# Patient Record
Sex: MALE | Race: WHITE | Employment: FULL TIME | URBAN - METROPOLITAN AREA
[De-identification: names, ages, dates, MRNs, and addresses within clinical notes are randomized per-mention and may not be internally consistent; named-entity substitution may affect disease eponyms.]

---

## 2018-10-31 ENCOUNTER — HOSPITAL ENCOUNTER (OUTPATIENT)
Dept: SURGERY | Age: 63
Discharge: HOME | End: 2018-10-31

## 2018-11-06 ENCOUNTER — ANESTHESIA EVENT (OUTPATIENT)
Dept: ENDOSCOPY | Age: 63
End: 2018-11-06
Payer: COMMERCIAL

## 2018-11-06 RX ORDER — SODIUM CHLORIDE, SODIUM LACTATE, POTASSIUM CHLORIDE, CALCIUM CHLORIDE 600; 310; 30; 20 MG/100ML; MG/100ML; MG/100ML; MG/100ML
100 INJECTION, SOLUTION INTRAVENOUS CONTINUOUS
Status: CANCELLED | OUTPATIENT
Start: 2018-11-06

## 2018-11-06 RX ORDER — SODIUM CHLORIDE 0.9 % (FLUSH) 0.9 %
5-10 SYRINGE (ML) INJECTION AS NEEDED
Status: CANCELLED | OUTPATIENT
Start: 2018-11-06

## 2018-11-07 ENCOUNTER — ANESTHESIA (OUTPATIENT)
Dept: ENDOSCOPY | Age: 63
End: 2018-11-07
Payer: COMMERCIAL

## 2018-11-07 ENCOUNTER — HOSPITAL ENCOUNTER (OUTPATIENT)
Age: 63
Setting detail: OUTPATIENT SURGERY
Discharge: HOME | End: 2018-11-07
Attending: SURGERY | Admitting: SURGERY
Payer: COMMERCIAL

## 2018-11-07 VITALS
TEMPERATURE: 98.1 F | OXYGEN SATURATION: 95 % | SYSTOLIC BLOOD PRESSURE: 131 MMHG | WEIGHT: 188.56 LBS | DIASTOLIC BLOOD PRESSURE: 84 MMHG | HEART RATE: 76 BPM | RESPIRATION RATE: 20 BRPM | BODY MASS INDEX: 25.4 KG/M2

## 2018-11-07 PROCEDURE — 76060000032 HC ANESTHESIA 0.5 TO 1 HR: Performed by: SURGERY

## 2018-11-07 PROCEDURE — 77030009426 HC FCPS BIOP ENDOSC BSC -B: Performed by: SURGERY

## 2018-11-07 PROCEDURE — 74011250636 HC RX REV CODE- 250/636

## 2018-11-07 PROCEDURE — 88305 TISSUE EXAM BY PATHOLOGIST: CPT

## 2018-11-07 PROCEDURE — 74011250636 HC RX REV CODE- 250/636: Performed by: ANESTHESIOLOGY

## 2018-11-07 PROCEDURE — 88312 SPECIAL STAINS GROUP 1: CPT

## 2018-11-07 PROCEDURE — 76040000026: Performed by: SURGERY

## 2018-11-07 RX ORDER — SODIUM CHLORIDE, SODIUM LACTATE, POTASSIUM CHLORIDE, CALCIUM CHLORIDE 600; 310; 30; 20 MG/100ML; MG/100ML; MG/100ML; MG/100ML
100 INJECTION, SOLUTION INTRAVENOUS CONTINUOUS
Status: DISCONTINUED | OUTPATIENT
Start: 2018-11-07 | End: 2018-11-07 | Stop reason: HOSPADM

## 2018-11-07 RX ORDER — LIDOCAINE HYDROCHLORIDE 20 MG/ML
INJECTION, SOLUTION EPIDURAL; INFILTRATION; INTRACAUDAL; PERINEURAL AS NEEDED
Status: DISCONTINUED | OUTPATIENT
Start: 2018-11-07 | End: 2018-11-07 | Stop reason: HOSPADM

## 2018-11-07 RX ORDER — PROPOFOL 10 MG/ML
INJECTION, EMULSION INTRAVENOUS AS NEEDED
Status: DISCONTINUED | OUTPATIENT
Start: 2018-11-07 | End: 2018-11-07 | Stop reason: HOSPADM

## 2018-11-07 RX ADMIN — PROPOFOL 25 MG: 10 INJECTION, EMULSION INTRAVENOUS at 11:09

## 2018-11-07 RX ADMIN — PROPOFOL 50 MG: 10 INJECTION, EMULSION INTRAVENOUS at 11:02

## 2018-11-07 RX ADMIN — PROPOFOL 50 MG: 10 INJECTION, EMULSION INTRAVENOUS at 11:16

## 2018-11-07 RX ADMIN — PROPOFOL 25 MG: 10 INJECTION, EMULSION INTRAVENOUS at 11:07

## 2018-11-07 RX ADMIN — PROPOFOL 50 MG: 10 INJECTION, EMULSION INTRAVENOUS at 11:23

## 2018-11-07 RX ADMIN — PROPOFOL 50 MG: 10 INJECTION, EMULSION INTRAVENOUS at 11:10

## 2018-11-07 RX ADMIN — PROPOFOL 25 MG: 10 INJECTION, EMULSION INTRAVENOUS at 11:08

## 2018-11-07 RX ADMIN — PROPOFOL 50 MG: 10 INJECTION, EMULSION INTRAVENOUS at 11:13

## 2018-11-07 RX ADMIN — PROPOFOL 50 MG: 10 INJECTION, EMULSION INTRAVENOUS at 11:30

## 2018-11-07 RX ADMIN — PROPOFOL 25 MG: 10 INJECTION, EMULSION INTRAVENOUS at 11:05

## 2018-11-07 RX ADMIN — PROPOFOL 25 MG: 10 INJECTION, EMULSION INTRAVENOUS at 11:36

## 2018-11-07 RX ADMIN — PROPOFOL 50 MG: 10 INJECTION, EMULSION INTRAVENOUS at 11:25

## 2018-11-07 RX ADMIN — PROPOFOL 25 MG: 10 INJECTION, EMULSION INTRAVENOUS at 11:03

## 2018-11-07 RX ADMIN — SODIUM CHLORIDE, SODIUM LACTATE, POTASSIUM CHLORIDE, AND CALCIUM CHLORIDE: 600; 310; 30; 20 INJECTION, SOLUTION INTRAVENOUS at 10:08

## 2018-11-07 RX ADMIN — PROPOFOL 50 MG: 10 INJECTION, EMULSION INTRAVENOUS at 11:18

## 2018-11-07 RX ADMIN — PROPOFOL 50 MG: 10 INJECTION, EMULSION INTRAVENOUS at 11:21

## 2018-11-07 RX ADMIN — LIDOCAINE HYDROCHLORIDE 60 MG: 20 INJECTION, SOLUTION EPIDURAL; INFILTRATION; INTRACAUDAL; PERINEURAL at 11:02

## 2018-11-07 RX ADMIN — PROPOFOL 50 MG: 10 INJECTION, EMULSION INTRAVENOUS at 11:28

## 2018-11-07 RX ADMIN — PROPOFOL 50 MG: 10 INJECTION, EMULSION INTRAVENOUS at 11:12

## 2018-11-07 RX ADMIN — PROPOFOL 25 MG: 10 INJECTION, EMULSION INTRAVENOUS at 11:04

## 2018-11-07 NOTE — ANESTHESIA PREPROCEDURE EVALUATION
Anesthetic History No history of anesthetic complications Review of Systems / Medical History Patient summary reviewed, nursing notes reviewed and pertinent labs reviewed Pulmonary Smoker Neuro/Psych Within defined limits Cardiovascular Pertinent negatives: No CAD Exercise tolerance: >4 METS 
  
GI/Hepatic/Renal 
  
GERD: well controlled Endo/Other Within defined limits Other Findings Physical Exam 
 
Airway Mallampati: II 
 
 
Mouth opening: Normal 
 
 Cardiovascular Regular rate and rhythm,  S1 and S2 normal,  no murmur, click, rub, or gallop Dental 
 
Dentition: Poor dentition and Upper partial plate Pulmonary Breath sounds clear to auscultation Abdominal 
 
 
 
 Other Findings Anesthetic Plan ASA: 2 Anesthesia type: total IV anesthesia Induction: Intravenous Anesthetic plan and risks discussed with: Patient and Spouse Discussed TIVA with its benefits (lower risk of nausea and sore throat, etc.) and risks including possible awareness, patient understands and elects to proceed

## 2018-11-07 NOTE — ANESTHESIA POSTPROCEDURE EVALUATION
Procedure(s): ESOPHAGOGASTRODUODENOSCOPY (EGD) POSS DILATION 
COLONOSCOPY/ 25 
ESOPHAGOGASTRODUODENAL (EGD) BIOPSY 
ESOPHAGEAL DILATION 
ENDOSCOPIC POLYPECTOMY. Anesthesia Post Evaluation Multimodal analgesia: multimodal analgesia used between 6 hours prior to anesthesia start to PACU discharge Patient location during evaluation: PACU Patient participation: complete - patient participated Level of consciousness: responsive to verbal stimuli Pain management: adequate Airway patency: patent Anesthetic complications: no 
Cardiovascular status: acceptable Respiratory status: spontaneous ventilation and nonlabored ventilation Hydration status: acceptable Comments: No Nausea Visit Vitals /84 (BP 1 Location: Left arm, BP Patient Position: At rest) Pulse 76 Temp 36.7 °C (98.1 °F) Resp 20 Wt 85.5 kg (188 lb 9 oz) SpO2 95% BMI 25.40 kg/m²

## 2018-11-07 NOTE — DISCHARGE INSTRUCTIONS
Clare Wade M.D.  [phone]    Instructions following colonoscopy:    ACTIVITY:   Resume usual, basic activities around the house today.  You may be light-headed or sleepy from anesthesia, so be careful going up and down stairs.  Avoid driving, operating machinery, or signing documents for 24 hours. DIET:   No restriction. Please note, some people may have nausea or cramps after this procedure which can result in an upset stomach after eating.  Many people have loose stools or diarrhea immediately after colonoscopy. It is also not uncommon to not have a bowel movement for 2-3 days. PAIN:   Some cramping or gas pain is normal after colonoscopy. However, if you experience worsening pain over the course of the day, or pain with associated fever please call the office immediately      [id number] IF:   You have a temperature higher than 101.5° Fahrenheit for more than 6 hours.  You have severe nausea or vomiting not relieved by medication; or diarrhea. If you take a blood thinning medicine resume it:    Otherwise, continue home medications as previously prescribed.

## 2018-11-07 NOTE — PERIOP NOTES
Patients numbers incorrect in system [employer] - [phone] and wife, Robinson Wilson - [phone]. Called business office to change in the system.

## 2018-11-07 NOTE — PROCEDURES
Esophagogastroduodenoscopy Procedure Note      Patient: Lázaro Hutson MRN: [id number]     YOB: 1955  Age: 60 y.o. Sex: male           Procedure in Detail:  Informed consent was obtained for the procedure, the patient was brought to the endoscopy suite and sedation was induced by anesthesia. The [id number] gastroscope was inserted into the mouth and advanced under direct vision to second portion of the duodenum. A careful inspection was made as the gastroscope was withdrawn, including a retroflexed view of the proximal stomach; findings and interventions are described below, with appropriate photodocumentation obtained. Findings:   Oropharynx:  Normal     - no significant visual abnormality of (closed)cords  Esophagus:  Normal     - to EGJ. focal stricture/ring at EGJ.  no inflammatory changes. dilated  Cardia of the stomach:  Normal  Body of the stomach:    - Flat lesions:     - mild gastritis  Fundus of the stomach:    - Flat lesions:     - mild gastritis  Antrum of the stomach:    - Flat lesions:     - mild gastritis  Duodenum:  Normal    Therapies:    esophageal dilation with savary sizes Horton 56/60F. Small focal tear of stricture noted on re-examination  biopsy of stomach pre pyloric antrum    EBL-  minimal    Specimens: Specimens were collected and sent to pathology. Complications:   None; patient tolerated the procedure well. Recommendations:  - Continue acid suppression.  - Await pathology.   - if indigestion persists, consider carafate- scant bilious fluid noted on initial gastric visualization    Signed by: Matteo Golden MD                         November 7, 2018

## 2018-11-07 NOTE — H&P
History and Physical 
 
Patient: Ebb Papa Physician: Yelena Santos MD 
 
Referring Physician: Maxwell Lambert MD 
 
Chief Complaint: For EGD/colonoscopy History of Present Illness: Personal history of colon polyps (screening only). His history is notable for no abdominal pain, no change in bowel habits, no blood in stools or black tarry stools. Last study:   2012 with single ascending tubular adenoma. There is not known colon cancer/adenomatous polyps in the family (see family history). 
  
Pt also underwent EGD/dilation at that time due to dysphagia. He was found to have mild esophageal stricture, dilated, and H pylori gastritis. He continues to have \"indigestion\" reported as bloating and feeling of need to burp. He takes Tums 1-2x/week with some benefit. Does not have difficulty swallowing or epigastric pain. A trial of OTC omperazole was suggested at recent office visit and he reports it is helping significantly. History: 
Past Medical History:  
Diagnosis Date  Abnormal EKG  Coronary atherosclerosis of native coronary vessel 10/14/2015  Difficulty swallowing x 1 yr--   
 not every day---but getting worse  GERD (gastroesophageal reflux disease)   
 has had an esophageal stricture dilated twice in past.  Last about 2 years ago  Helicobacter pylori gastritis 2012  Hyperlipidemia other unsp dyslipidemia 10/14/2015  Obese   
 bmi=31  
 Stricture of esophagus 2012  Tobacco use disorder 10/14/2015 Past Surgical History:  
Procedure Laterality Date  DILATE ESOPHAGUS  5/18/2012  HX LAP CHOLECYSTECTOMY  2004  [place] FL[place] W/RMVL OF TUMOR POLYP LESION SNARE TQ  5/18/2012  [place] TRANSORAL BIOPSY SINGLE/MULTIPLE  5/18/2012 Social History Socioeconomic History  Marital status:  Spouse name: Not on file  Number of children: Not on file  Years of education: Not on file  Highest education level: Not on file Social Needs  Financial resource strain: Not on file  Food insecurity - worry: Not on file  Food insecurity - inability: Not on file  Transportation needs - medical: Not on file  Transportation needs - non-medical: Not on file Occupational History  Not on file Tobacco Use  Smoking status: Current Every Day Smoker Years: 6.00  Smokeless tobacco: Never Used  Tobacco comment: smokes 5 cigars Substance and Sexual Activity  Alcohol use: No  
 Drug use: No  
 Sexual activity: Yes  
  Partners: Female Other Topics Concern  Not on file Social History Narrative  Not on file Family History Problem Relation Age of Onset  Cancer Brother Medications:  
Prior to Admission medications Medication Sig Start Date End Date Taking? Authorizing Provider  
topiramate (TOPAMAX) 100 mg tablet Take 1 Tab by mouth two (2) times a day. 8/4/17  Yes Katelin Woods MD  
zaleplon (SONATA) 10 mg capsule Take 1 Cap by mouth nightly. Max Daily Amount: 10 mg. 8/4/17  Yes Katelin Woods MD  
SUMAtriptan (IMITREX) 100 mg tablet Take 1 Tab by mouth once as needed for Migraine. 8/4/17  Yes Katelin Woods MD  
ibuprofen (ADVIL) 200 mg tablet Take 400 mg by mouth every six (6) hours as needed. Stopped 5/16/12    Yes Provider, Historical  
 
 
Allergies: No Known Allergies Physical Exam:  
 
Vital Signs:  
Visit Vitals BP (!) 150/93 Pulse 83 Temp 98.1 °F (36.7 °C) Resp 16 Wt 188 lb 9 oz (85.5 kg) SpO2 97% BMI 25.40 kg/m² Junius El General: no distress Heart: regular Lungs: unlabored Abdominal: soft Neurological: Grossly normal  
  
 
Findings/Diagnosis: Encounter for colorectal cancer screening due to personal history of adenomatous polyp(s) . Indigestion with h/o H pylori gastritis,  GERD with h/o esophageal stricture Plan of Care/Planned Procedure: egd possible dilation,  Colonoscopy, possible polypectomy. Pt/designee agree to proceed. Signed: 
Stalin Parsons MD 
 11/7/2018

## 2018-11-07 NOTE — PROCEDURES
Procedure in Detail:  Informed consent was obtained for the procedure. The patient was placed in the left lateral decubitus position and sedation was induced by anesthesia. The [id number] was inserted into the rectum and advanced under direct vision to the cecum, which was identified by the appendiceal orifice. The quality of the colonic preparation was fair, consistent with pt reported solid breakfast yesterday. In addition, visualization limited by unrelenting coughing. A careful inspection was made as the colonoscope was withdrawn, including a retroflexed view of the rectum; findings and interventions are described below. Appropriate photodocumentation was obtained. Findings:   Rectum:   Normal  Sigmoid:     - Excavated lesions:     - Diverticulosis  Descending Colon:     - Excavated lesions:     - Diverticulum  Transverse Colon:     - Excavated lesions:     - Diverticulum  Ascending Colon:   Normal  Cecum:     - Protruding lesions:     -Sessile Polyp(s) size 3 mm removed by polypectomy (hot biopsy)          Specimens: Specimens were collected and sent to pathology. Complications: None; patient tolerated the procedure well. \    EBL - none    Recommendations:   - Await pathology.      Signed By: Mellisa Leary MD                        November 7, 2018

## 2018-12-05 ENCOUNTER — APPOINTMENT (OUTPATIENT)
Dept: GENERAL RADIOLOGY | Age: 63
DRG: 247 | End: 2018-12-05
Attending: EMERGENCY MEDICINE
Payer: COMMERCIAL

## 2018-12-05 ENCOUNTER — HOSPITAL ENCOUNTER (INPATIENT)
Age: 63
LOS: 1 days | Discharge: HOME | DRG: 247 | End: 2018-12-06
Attending: EMERGENCY MEDICINE | Admitting: INTERNAL MEDICINE
Payer: COMMERCIAL

## 2018-12-05 DIAGNOSIS — R77.8 ELEVATED TROPONIN: ICD-10-CM

## 2018-12-05 DIAGNOSIS — I24.9 ACUTE CORONARY SYNDROME (HCC): Primary | ICD-10-CM

## 2018-12-05 PROBLEM — I20.0 UNSTABLE ANGINA (HCC): Status: ACTIVE | Noted: 2018-12-05

## 2018-12-05 PROBLEM — I21.4 NSTEMI (NON-ST ELEVATED MYOCARDIAL INFARCTION) (HCC): Status: ACTIVE | Noted: 2018-12-05

## 2018-12-05 LAB
ALBUMIN SERPL-MCNC: 4 G/DL (ref 3.2–4.6)
ALBUMIN/GLOB SERPL: 1.1 {RATIO}
ALP SERPL-CCNC: 79 U/L (ref 50–136)
ALT SERPL-CCNC: 27 U/L (ref 12–65)
ANION GAP SERPL CALC-SCNC: 8 MMOL/L
APTT PPP: 30.8 SEC (ref 24.7–39.8)
AST SERPL-CCNC: 19 U/L (ref 15–37)
BASOPHILS # BLD: 0.1 K/UL (ref 0–0.2)
BASOPHILS NFR BLD: 1 % (ref 0–2)
BILIRUB SERPL-MCNC: 0.2 MG/DL (ref 0.2–1.1)
BUN SERPL-MCNC: 19 MG/DL (ref 8–23)
CALCIUM SERPL-MCNC: 8.7 MG/DL (ref 8.3–10.4)
CHLORIDE SERPL-SCNC: 108 MMOL/L (ref 98–107)
CHOLEST SERPL-MCNC: 158 MG/DL
CO2 SERPL-SCNC: 26 MMOL/L (ref 21–32)
CREAT SERPL-MCNC: 0.99 MG/DL (ref 0.8–1.5)
DIFFERENTIAL METHOD BLD: NORMAL
EOSINOPHIL # BLD: 0.1 K/UL (ref 0–0.8)
EOSINOPHIL NFR BLD: 1 % (ref 0.5–7.8)
ERYTHROCYTE [DISTWIDTH] IN BLOOD BY AUTOMATED COUNT: 13.1 % (ref 11.9–14.6)
GLOBULIN SER CALC-MCNC: 3.6 G/DL (ref 2.3–3.5)
GLUCOSE SERPL-MCNC: 108 MG/DL (ref 65–100)
HCT VFR BLD AUTO: 48.3 % (ref 41.1–50.3)
HDLC SERPL-MCNC: 38 MG/DL (ref 40–60)
HDLC SERPL: 4.2 {RATIO}
HGB BLD-MCNC: 16.2 G/DL (ref 13.6–17.2)
IMM GRANULOCYTES # BLD: 0 K/UL (ref 0–0.5)
IMM GRANULOCYTES NFR BLD AUTO: 0 % (ref 0–5)
INR PPP: 0.9
LDLC SERPL CALC-MCNC: 79.2 MG/DL
LIPID PROFILE,FLP: ABNORMAL
LYMPHOCYTES # BLD: 2.2 K/UL (ref 0.5–4.6)
LYMPHOCYTES NFR BLD: 26 % (ref 13–44)
MCH RBC QN AUTO: 32.1 PG (ref 26.1–32.9)
MCHC RBC AUTO-ENTMCNC: 33.5 G/DL (ref 31.4–35)
MCV RBC AUTO: 95.6 FL (ref 79.6–97.8)
MONOCYTES # BLD: 0.9 K/UL (ref 0.1–1.3)
MONOCYTES NFR BLD: 11 % (ref 4–12)
NEUTS SEG # BLD: 5.1 K/UL (ref 1.7–8.2)
NEUTS SEG NFR BLD: 61 % (ref 43–78)
NRBC # BLD: 0 K/UL (ref 0–0.2)
PLATELET # BLD AUTO: 248 K/UL (ref 150–450)
PMV BLD AUTO: 10.4 FL (ref 9.4–12.3)
POTASSIUM SERPL-SCNC: 3.8 MMOL/L (ref 3.5–5.1)
PROT SERPL-MCNC: 7.6 G/DL
PROTHROMBIN TIME: 12.2 SEC (ref 11.7–14.5)
RBC # BLD AUTO: 5.05 M/UL (ref 4.23–5.6)
SODIUM SERPL-SCNC: 142 MMOL/L (ref 136–145)
TRIGL SERPL-MCNC: 204 MG/DL (ref 35–150)
TROPONIN I BLD-MCNC: 0.46 NG/ML (ref 0.02–0.05)
VLDLC SERPL CALC-MCNC: 40.8 MG/DL (ref 6–23)
WBC # BLD AUTO: 8.3 K/UL (ref 4.3–11.1)

## 2018-12-05 PROCEDURE — 96375 TX/PRO/DX INJ NEW DRUG ADDON: CPT | Performed by: EMERGENCY MEDICINE

## 2018-12-05 PROCEDURE — 65660000000 HC RM CCU STEPDOWN

## 2018-12-05 PROCEDURE — 84484 ASSAY OF TROPONIN QUANT: CPT

## 2018-12-05 PROCEDURE — 96365 THER/PROPH/DIAG IV INF INIT: CPT | Performed by: EMERGENCY MEDICINE

## 2018-12-05 PROCEDURE — 85730 THROMBOPLASTIN TIME PARTIAL: CPT

## 2018-12-05 PROCEDURE — 96374 THER/PROPH/DIAG INJ IV PUSH: CPT | Performed by: EMERGENCY MEDICINE

## 2018-12-05 PROCEDURE — 80061 LIPID PANEL: CPT

## 2018-12-05 PROCEDURE — 85610 PROTHROMBIN TIME: CPT

## 2018-12-05 PROCEDURE — 74011250636 HC RX REV CODE- 250/636: Performed by: EMERGENCY MEDICINE

## 2018-12-05 PROCEDURE — 74011250637 HC RX REV CODE- 250/637: Performed by: INTERNAL MEDICINE

## 2018-12-05 PROCEDURE — 74011250637 HC RX REV CODE- 250/637: Performed by: EMERGENCY MEDICINE

## 2018-12-05 PROCEDURE — 85025 COMPLETE CBC W/AUTO DIFF WBC: CPT

## 2018-12-05 PROCEDURE — 96366 THER/PROPH/DIAG IV INF ADDON: CPT | Performed by: EMERGENCY MEDICINE

## 2018-12-05 PROCEDURE — 99285 EMERGENCY DEPT VISIT HI MDM: CPT | Performed by: EMERGENCY MEDICINE

## 2018-12-05 PROCEDURE — 71046 X-RAY EXAM CHEST 2 VIEWS: CPT

## 2018-12-05 PROCEDURE — 74011000250 HC RX REV CODE- 250: Performed by: EMERGENCY MEDICINE

## 2018-12-05 PROCEDURE — 80053 COMPREHEN METABOLIC PANEL: CPT

## 2018-12-05 PROCEDURE — 94762 N-INVAS EAR/PLS OXIMTRY CONT: CPT | Performed by: EMERGENCY MEDICINE

## 2018-12-05 PROCEDURE — 93005 ELECTROCARDIOGRAM TRACING: CPT | Performed by: EMERGENCY MEDICINE

## 2018-12-05 RX ORDER — GUAIFENESIN 100 MG/5ML
81 LIQUID (ML) ORAL DAILY
Status: DISCONTINUED | OUTPATIENT
Start: 2018-12-06 | End: 2018-12-06 | Stop reason: HOSPADM

## 2018-12-05 RX ORDER — GUAIFENESIN 100 MG/5ML
324 LIQUID (ML) ORAL
Status: COMPLETED | OUTPATIENT
Start: 2018-12-05 | End: 2018-12-05

## 2018-12-05 RX ORDER — SODIUM CHLORIDE 0.9 % (FLUSH) 0.9 %
5-10 SYRINGE (ML) INJECTION EVERY 8 HOURS
Status: DISCONTINUED | OUTPATIENT
Start: 2018-12-05 | End: 2018-12-06 | Stop reason: HOSPADM

## 2018-12-05 RX ORDER — METOPROLOL TARTRATE 5 MG/5ML
5 INJECTION INTRAVENOUS ONCE
Status: COMPLETED | OUTPATIENT
Start: 2018-12-05 | End: 2018-12-05

## 2018-12-05 RX ORDER — HEPARIN SODIUM 5000 [USP'U]/ML
4000 INJECTION, SOLUTION INTRAVENOUS; SUBCUTANEOUS
Status: COMPLETED | OUTPATIENT
Start: 2018-12-05 | End: 2018-12-05

## 2018-12-05 RX ORDER — HEPARIN SODIUM 5000 [USP'U]/100ML
12-25 INJECTION, SOLUTION INTRAVENOUS
Status: DISCONTINUED | OUTPATIENT
Start: 2018-12-05 | End: 2018-12-06 | Stop reason: HOSPADM

## 2018-12-05 RX ORDER — SODIUM CHLORIDE 0.9 % (FLUSH) 0.9 %
5-10 SYRINGE (ML) INJECTION AS NEEDED
Status: DISCONTINUED | OUTPATIENT
Start: 2018-12-05 | End: 2018-12-06 | Stop reason: HOSPADM

## 2018-12-05 RX ORDER — ATORVASTATIN CALCIUM 40 MG/1
80 TABLET, FILM COATED ORAL
Status: DISCONTINUED | OUTPATIENT
Start: 2018-12-05 | End: 2018-12-06 | Stop reason: HOSPADM

## 2018-12-05 RX ADMIN — METOPROLOL TARTRATE 5 MG: 1 INJECTION, SOLUTION INTRAVENOUS at 17:41

## 2018-12-05 RX ADMIN — ASPIRIN 81 MG 324 MG: 81 TABLET ORAL at 17:39

## 2018-12-05 RX ADMIN — ATORVASTATIN CALCIUM 80 MG: 40 TABLET, FILM COATED ORAL at 23:24

## 2018-12-05 RX ADMIN — HEPARIN SODIUM 4000 UNITS: 5000 INJECTION INTRAVENOUS; SUBCUTANEOUS at 18:43

## 2018-12-05 RX ADMIN — NITROGLYCERIN 1 INCH: 20 OINTMENT TOPICAL at 17:38

## 2018-12-05 RX ADMIN — HEPARIN SODIUM AND DEXTROSE 12 UNITS/KG/HR: 5000; 5 INJECTION INTRAVENOUS at 19:26

## 2018-12-05 RX ADMIN — Medication 10 ML: at 23:24

## 2018-12-05 NOTE — ED TRIAGE NOTES
Pt states intermittent chest pain for about two days. States nothing makes the pain better or worse. States today he got a little SOB and left arm went numb.  Pt states was evaluated by GI last month and BP was normal.

## 2018-12-05 NOTE — ED PROVIDER NOTES
Patient complains of multiple episodes of chest discomfort today while at work. Chest discomfort described as a pressure or heaviness to the anterior aspect of his upper chest with associated shortness of breath, nausea, diaphoresis with the last episode. Chest pain only lasted for approximate 10-15 minutes, resolved with rest. 
 
 
The history is provided by the patient. Chest Pain (Angina) This is a new problem. The current episode started 3 to 5 hours ago. The problem has been resolved. Duration of episode(s) is 15 minutes. The problem occurs daily. The quality of the pain is described as pressure-like and heavy. The pain radiates to the left arm. The symptoms are aggravated by exertion. Associated symptoms include diaphoresis (With last episode), exertional chest pressure, malaise/fatigue, nausea (with chest pain), PND (possibly one episode 2 nights ago  with associated chest discomfort, awakening him from sleep) and shortness of breath (when he gets the discomfort). Pertinent negatives include no abdominal pain, no back pain, no claudication, no cough, no dizziness, no fever, no headaches, no hemoptysis, no irregular heartbeat, no leg pain, no lower extremity edema, no near-syncope, no numbness, no orthopnea, no palpitations, no sputum production, no vomiting and no weakness. He has tried rest for the symptoms. The treatment provided significant relief. Risk factors include smoking/tobacco exposure and male gender. His past medical history does not include aneurysm, cancer, DM, DVT, HTN, PE or CHF. Pertinent negatives include no cardiac catheterization. Past Medical History:  
Diagnosis Date  Abnormal EKG  Coronary atherosclerosis of native coronary vessel 10/14/2015  Difficulty swallowing x 1 yr--   
 not every day---but getting worse  GERD (gastroesophageal reflux disease)   
 has had an esophageal stricture dilated twice in past.  Last about 2 years ago  Helicobacter pylori gastritis 2012  Hyperlipidemia other unsp dyslipidemia 10/14/2015  Obese   
 bmi=31  
 Stricture of esophagus 2012  Tobacco use disorder 10/14/2015 Past Surgical History:  
Procedure Laterality Date  COLONOSCOPY N/A 11/7/2018 COLONOSCOPY/ 25 performed by Grace Jefferson MD at [facility]  DILATE ESOPHAGUS  5/18/2012  DILATE ESOPHAGUS  11/7/2018  HX LAP CHOLECYSTECTOMY  2004  [place] FLX W/REMOVAL LESION BY HOT BX FORCEPS  11/7/2018  [place] FLX W/RMVL OF TUMOR POLYP LESION SNARE TQ  5/18/2012  [place] E[place] TRANSORAL BIOPSY SINGLE/MULTIPLE  5/18/2012  [place] E[place] TRANSORAL BIOPSY SINGLE/MULTIPLE  11/7/2018 Family History:  
Problem Relation Age of Onset  Cancer Brother Social History Socioeconomic History  Marital status:  Spouse name: Not on file  Number of children: Not on file  Years of education: Not on file  Highest education level: Not on file Social Needs  Financial resource strain: Not on file  Food insecurity - worry: Not on file  Food insecurity - inability: Not on file  Transportation needs - medical: Not on file  Transportation needs - non-medical: Not on file Occupational History  Not on file Tobacco Use  Smoking status: Current Every Day Smoker Years: 6.00  Smokeless tobacco: Never Used  Tobacco comment: smokes 5 cigars Substance and Sexual Activity  Alcohol use: No  
 Drug use: No  
 Sexual activity: Yes  
  Partners: Female Other Topics Concern  Not on file Social History Narrative  Not on file ALLERGIES: Patient has no known allergies. Review of Systems Constitutional: Positive for diaphoresis (With last episode) and malaise/fatigue. Negative for fever. Respiratory: Positive for shortness of breath (when he gets the discomfort). Negative for cough, hemoptysis and sputum production. Cardiovascular: Positive for chest pain and PND (possibly one episode 2 nights ago  with associated chest discomfort, awakening him from sleep). Negative for palpitations, orthopnea, claudication and near-syncope. Gastrointestinal: Positive for nausea (with chest pain). Negative for abdominal pain and vomiting. Musculoskeletal: Negative for back pain. Neurological: Negative for dizziness, weakness, numbness and headaches. All other systems reviewed and are negative. Vitals:  
 12/05/18 1655 BP: (!) 213/125 Pulse: 67 Resp: 16 Temp: 97.6 °F (36.4 °C) SpO2: 97% Weight: 84.8 kg (187 lb) Height: 5' 10\" (1.778 m) Physical Exam  
Constitutional: He is oriented to person, place, and time. He appears well-developed and well-nourished. No distress. HENT:  
Head: Normocephalic and atraumatic. Right Ear: External ear normal.  
Left Ear: External ear normal.  
Mouth/Throat: Oropharynx is clear and moist.  
Eyes: Conjunctivae and EOM are normal. Pupils are equal, round, and reactive to light. Neck: Normal range of motion. Neck supple. Cardiovascular: Normal rate, regular rhythm, normal heart sounds and intact distal pulses. Pulmonary/Chest: Effort normal and breath sounds normal. He exhibits no tenderness. Abdominal: Soft. Bowel sounds are normal. There is no tenderness. Musculoskeletal: Normal range of motion. He exhibits no edema. Lymphadenopathy:  
     Head (right side): No submandibular adenopathy present. Head (left side): No submandibular adenopathy present. Neurological: He is alert and oriented to person, place, and time. He has normal strength. No sensory deficit. Skin: Skin is warm and dry. Capillary refill takes less than 2 seconds. Psychiatric: He has a normal mood and affect. Nursing note and vitals reviewed. MDM Number of Diagnoses or Management Options Acute coronary syndrome [facility]): new and requires workup Elevated troponin: new and requires workup Amount and/or Complexity of Data Reviewed Clinical lab tests: ordered and reviewed Tests in the radiology section of CPT®: ordered and reviewed Review and summarize past medical records: yes Discuss the patient with other providers: yes Independent visualization of images, tracings, or specimens: yes Risk of Complications, Morbidity, and/or Mortality Presenting problems: high Diagnostic procedures: moderate Management options: high Patient Progress Patient progress: stable Procedures The patient was observed in the ED. Results Reviewed: 
 
 
Recent Results (from the past 24 hour(s)) POC TROPONIN-I Collection Time: 12/05/18  4:53 PM  
Result Value Ref Range Troponin-I (POC) 0.46 (H) 0.02 - 0.05 ng/ml CBC WITH AUTOMATED DIFF Collection Time: 12/05/18  4:58 PM  
Result Value Ref Range WBC 8.3 4.3 - 11.1 K/uL  
 RBC 5.05 4.23 - 5.6 M/uL  
 HGB 16.2 13.6 - 17.2 g/dL HCT 48.3 41.1 - 50.3 % MCV 95.6 79.6 - 97.8 FL  
 MCH 32.1 26.1 - 32.9 PG  
 MCHC 33.5 31.4 - 35.0 g/dL  
 RDW 13.1 11.9 - 14.6 % PLATELET 430 143 - 678 K/uL MPV 10.4 9.4 - 12.3 FL ABSOLUTE NRBC 0.00 0.0 - 0.2 K/uL  
 DF AUTOMATED NEUTROPHILS 61 43 - 78 % LYMPHOCYTES 26 13 - 44 % MONOCYTES 11 4.0 - 12.0 % EOSINOPHILS 1 0.5 - 7.8 % BASOPHILS 1 0.0 - 2.0 % IMMATURE GRANULOCYTES 0 0.0 - 5.0 %  
 ABS. NEUTROPHILS 5.1 1.7 - 8.2 K/UL  
 ABS. LYMPHOCYTES 2.2 0.5 - 4.6 K/UL  
 ABS. MONOCYTES 0.9 0.1 - 1.3 K/UL  
 ABS. EOSINOPHILS 0.1 0.0 - 0.8 K/UL  
 ABS. BASOPHILS 0.1 0.0 - 0.2 K/UL  
 ABS. IMM. GRANS. 0.0 0.0 - 0.5 K/UL METABOLIC PANEL, COMPREHENSIVE Collection Time: 12/05/18  4:58 PM  
Result Value Ref Range Sodium 142 136 - 145 mmol/L Potassium 3.8 3.5 - 5.1 mmol/L Chloride 108 (H) 98 - 107 mmol/L  
 CO2 26 21 - 32 mmol/L Anion gap 8 mmol/L Glucose 108 (H) 65 - 100 mg/dL  BUN 19 8 - 23 MG/DL  
 Creatinine 0.99 0.8 - 1.5 MG/DL  
 GFR est AA >60 >60 ml/min/1.73m2 GFR est non-AA >60 ml/min/1.73m2 Calcium 8.7 8.3 - 10.4 MG/DL Bilirubin, total 0.2 0.2 - 1.1 MG/DL  
 ALT (SGPT) 27 12 - 65 U/L  
 AST (SGOT) 19 15 - 37 U/L Alk. phosphatase 79 50 - 136 U/L Protein, total 7.6 g/dL Albumin 4.0 3.2 - 4.6 g/dL Globulin 3.6 (H) 2.3 - 3.5 g/dL A-G Ratio 1.1 XR CHEST PA LAT Final Result IMPRESSION: Unremarkable chest radiograph.

## 2018-12-06 ENCOUNTER — HOSPITAL ENCOUNTER (INPATIENT)
Age: 63
LOS: 1 days | Discharge: HOME | DRG: 247 | End: 2018-12-07
Attending: INTERNAL MEDICINE | Admitting: INTERNAL MEDICINE
Payer: COMMERCIAL

## 2018-12-06 VITALS
RESPIRATION RATE: 24 BRPM | TEMPERATURE: 98.7 F | DIASTOLIC BLOOD PRESSURE: 73 MMHG | HEART RATE: 58 BPM | BODY MASS INDEX: 28.29 KG/M2 | SYSTOLIC BLOOD PRESSURE: 124 MMHG | OXYGEN SATURATION: 95 % | WEIGHT: 197.6 LBS | HEIGHT: 70 IN

## 2018-12-06 LAB
ACT BLD: 285 SECS (ref 70–128)
APTT PPP: 41.5 SEC (ref 24.7–39.8)
APTT PPP: 69.1 SEC (ref 24.7–39.8)
ATRIAL RATE: 59 BPM
CALCULATED P AXIS, ECG09: 72 DEGREES
CALCULATED R AXIS, ECG10: 72 DEGREES
CALCULATED T AXIS, ECG11: 90 DEGREES
DIAGNOSIS, 93000: NORMAL
P-R INTERVAL, ECG05: 176 MS
Q-T INTERVAL, ECG07: 404 MS
QRS DURATION, ECG06: 78 MS
QTC CALCULATION (BEZET), ECG08: 399 MS
VENTRICULAR RATE, ECG03: 59 BPM

## 2018-12-06 PROCEDURE — 77030004559 HC CATH ANGI DX SUPT CARD -B

## 2018-12-06 PROCEDURE — 92928 PRQ TCAT PLMT NTRAC ST 1 LES: CPT

## 2018-12-06 PROCEDURE — 4A023N7 MEASUREMENT OF CARDIAC SAMPLING AND PRESSURE, LEFT HEART, PERCUTANEOUS APPROACH: ICD-10-PCS | Performed by: INTERNAL MEDICINE

## 2018-12-06 PROCEDURE — 36415 COLL VENOUS BLD VENIPUNCTURE: CPT

## 2018-12-06 PROCEDURE — 74011250636 HC RX REV CODE- 250/636: Performed by: INTERNAL MEDICINE

## 2018-12-06 PROCEDURE — 74011250637 HC RX REV CODE- 250/637: Performed by: INTERNAL MEDICINE

## 2018-12-06 PROCEDURE — 74011000250 HC RX REV CODE- 250: Performed by: INTERNAL MEDICINE

## 2018-12-06 PROCEDURE — B2151ZZ FLUOROSCOPY OF LEFT HEART USING LOW OSMOLAR CONTRAST: ICD-10-PCS | Performed by: INTERNAL MEDICINE

## 2018-12-06 PROCEDURE — C1769 GUIDE WIRE: HCPCS

## 2018-12-06 PROCEDURE — 77030004558 HC CATH ANGI DX SUPR TORQ CARD -A

## 2018-12-06 PROCEDURE — 93005 ELECTROCARDIOGRAM TRACING: CPT | Performed by: INTERNAL MEDICINE

## 2018-12-06 PROCEDURE — 85730 THROMBOPLASTIN TIME PARTIAL: CPT

## 2018-12-06 PROCEDURE — 74011250636 HC RX REV CODE- 250/636

## 2018-12-06 PROCEDURE — 74011636320 HC RX REV CODE- 636/320: Performed by: INTERNAL MEDICINE

## 2018-12-06 PROCEDURE — C1725 CATH, TRANSLUMIN NON-LASER: HCPCS

## 2018-12-06 PROCEDURE — C1874 STENT, COATED/COV W/DEL SYS: HCPCS

## 2018-12-06 PROCEDURE — 027034Z DILATION OF CORONARY ARTERY, ONE ARTERY WITH DRUG-ELUTING INTRALUMINAL DEVICE, PERCUTANEOUS APPROACH: ICD-10-PCS | Performed by: INTERNAL MEDICINE

## 2018-12-06 PROCEDURE — 65660000000 HC RM CCU STEPDOWN

## 2018-12-06 PROCEDURE — 99152 MOD SED SAME PHYS/QHP 5/>YRS: CPT

## 2018-12-06 PROCEDURE — 93458 L HRT ARTERY/VENTRICLE ANGIO: CPT

## 2018-12-06 PROCEDURE — 85347 COAGULATION TIME ACTIVATED: CPT

## 2018-12-06 PROCEDURE — C1894 INTRO/SHEATH, NON-LASER: HCPCS

## 2018-12-06 PROCEDURE — 74011250636 HC RX REV CODE- 250/636: Performed by: PHYSICIAN ASSISTANT

## 2018-12-06 PROCEDURE — 99153 MOD SED SAME PHYS/QHP EA: CPT

## 2018-12-06 PROCEDURE — C1887 CATHETER, GUIDING: HCPCS

## 2018-12-06 PROCEDURE — 77030019569 HC BND COMPR RAD TERU -B

## 2018-12-06 PROCEDURE — B2111ZZ FLUOROSCOPY OF MULTIPLE CORONARY ARTERIES USING LOW OSMOLAR CONTRAST: ICD-10-PCS | Performed by: INTERNAL MEDICINE

## 2018-12-06 RX ORDER — LORAZEPAM 1 MG/1
1 TABLET ORAL
Status: DISCONTINUED | OUTPATIENT
Start: 2018-12-06 | End: 2018-12-07 | Stop reason: HOSPADM

## 2018-12-06 RX ORDER — HEPARIN SODIUM 10000 [USP'U]/ML
2000 INJECTION, SOLUTION INTRAVENOUS; SUBCUTANEOUS
Status: DISCONTINUED | OUTPATIENT
Start: 2018-12-06 | End: 2018-12-07 | Stop reason: HOSPADM

## 2018-12-06 RX ORDER — MIDAZOLAM HYDROCHLORIDE 1 MG/ML
.5-5 INJECTION, SOLUTION INTRAMUSCULAR; INTRAVENOUS
Status: DISCONTINUED | OUTPATIENT
Start: 2018-12-06 | End: 2018-12-07 | Stop reason: HOSPADM

## 2018-12-06 RX ORDER — HEPARIN SODIUM 5000 [USP'U]/ML
35 INJECTION, SOLUTION INTRAVENOUS; SUBCUTANEOUS ONCE
Status: COMPLETED | OUTPATIENT
Start: 2018-12-06 | End: 2018-12-06

## 2018-12-06 RX ORDER — ACETAMINOPHEN 325 MG/1
650 TABLET ORAL
Status: DISCONTINUED | OUTPATIENT
Start: 2018-12-06 | End: 2018-12-07 | Stop reason: HOSPADM

## 2018-12-06 RX ORDER — FENTANYL CITRATE 50 UG/ML
25-100 INJECTION, SOLUTION INTRAMUSCULAR; INTRAVENOUS
Status: DISCONTINUED | OUTPATIENT
Start: 2018-12-06 | End: 2018-12-07 | Stop reason: HOSPADM

## 2018-12-06 RX ORDER — MORPHINE SULFATE 2 MG/ML
1 INJECTION, SOLUTION INTRAMUSCULAR; INTRAVENOUS
Status: DISCONTINUED | OUTPATIENT
Start: 2018-12-06 | End: 2018-12-07 | Stop reason: HOSPADM

## 2018-12-06 RX ORDER — SODIUM CHLORIDE 0.9 % (FLUSH) 0.9 %
5-10 SYRINGE (ML) INJECTION AS NEEDED
Status: DISCONTINUED | OUTPATIENT
Start: 2018-12-06 | End: 2018-12-07 | Stop reason: HOSPADM

## 2018-12-06 RX ORDER — NITROGLYCERIN 0.4 MG/1
0.4 TABLET SUBLINGUAL
Status: DISCONTINUED | OUTPATIENT
Start: 2018-12-06 | End: 2018-12-07 | Stop reason: HOSPADM

## 2018-12-06 RX ORDER — SODIUM CHLORIDE 9 MG/ML
75 INJECTION, SOLUTION INTRAVENOUS CONTINUOUS
Status: DISCONTINUED | OUTPATIENT
Start: 2018-12-06 | End: 2018-12-07 | Stop reason: HOSPADM

## 2018-12-06 RX ORDER — ATORVASTATIN CALCIUM 40 MG/1
80 TABLET, FILM COATED ORAL
Status: DISCONTINUED | OUTPATIENT
Start: 2018-12-06 | End: 2018-12-07 | Stop reason: HOSPADM

## 2018-12-06 RX ORDER — LOSARTAN POTASSIUM 25 MG/1
25 TABLET ORAL DAILY
Status: DISCONTINUED | OUTPATIENT
Start: 2018-12-07 | End: 2018-12-07 | Stop reason: HOSPADM

## 2018-12-06 RX ORDER — METOPROLOL TARTRATE 25 MG/1
25 TABLET, FILM COATED ORAL EVERY 12 HOURS
Status: DISCONTINUED | OUTPATIENT
Start: 2018-12-06 | End: 2018-12-06

## 2018-12-06 RX ORDER — GUAIFENESIN 100 MG/5ML
81 LIQUID (ML) ORAL DAILY
Status: DISCONTINUED | OUTPATIENT
Start: 2018-12-07 | End: 2018-12-07 | Stop reason: HOSPADM

## 2018-12-06 RX ORDER — LIDOCAINE HYDROCHLORIDE 10 MG/ML
1-20 INJECTION INFILTRATION; PERINEURAL ONCE
Status: COMPLETED | OUTPATIENT
Start: 2018-12-06 | End: 2018-12-06

## 2018-12-06 RX ORDER — HEPARIN SODIUM 200 [USP'U]/100ML
3 INJECTION, SOLUTION INTRAVENOUS CONTINUOUS
Status: DISCONTINUED | OUTPATIENT
Start: 2018-12-06 | End: 2018-12-07 | Stop reason: HOSPADM

## 2018-12-06 RX ORDER — GUAIFENESIN 100 MG/5ML
81 LIQUID (ML) ORAL DAILY
Status: DISCONTINUED | OUTPATIENT
Start: 2018-12-07 | End: 2018-12-06 | Stop reason: SDUPTHER

## 2018-12-06 RX ORDER — SODIUM CHLORIDE 0.9 % (FLUSH) 0.9 %
5-10 SYRINGE (ML) INJECTION EVERY 8 HOURS
Status: DISCONTINUED | OUTPATIENT
Start: 2018-12-06 | End: 2018-12-07 | Stop reason: HOSPADM

## 2018-12-06 RX ADMIN — FENTANYL CITRATE 25 MCG: 50 INJECTION, SOLUTION INTRAMUSCULAR; INTRAVENOUS at 15:53

## 2018-12-06 RX ADMIN — LIDOCAINE HYDROCHLORIDE 3 ML: 10 INJECTION, SOLUTION INFILTRATION; PERINEURAL at 15:05

## 2018-12-06 RX ADMIN — ASPIRIN 81 MG CHEWABLE TABLET 81 MG: 81 TABLET CHEWABLE at 08:31

## 2018-12-06 RX ADMIN — SODIUM CHLORIDE 75 ML/HR: 900 INJECTION, SOLUTION INTRAVENOUS at 17:24

## 2018-12-06 RX ADMIN — HEPARIN SODIUM 3150 UNITS: 5000 INJECTION INTRAVENOUS; SUBCUTANEOUS at 02:18

## 2018-12-06 RX ADMIN — HEPARIN SODIUM 3 ML/HR: 5000 INJECTION, SOLUTION INTRAVENOUS; SUBCUTANEOUS at 15:04

## 2018-12-06 RX ADMIN — HEPARIN SODIUM 3150 UNITS: 5000 INJECTION INTRAVENOUS; SUBCUTANEOUS at 09:52

## 2018-12-06 RX ADMIN — HEPARIN SODIUM 2 ML: 10000 INJECTION INTRAVENOUS; SUBCUTANEOUS at 15:46

## 2018-12-06 RX ADMIN — ATORVASTATIN CALCIUM 80 MG: 40 TABLET, FILM COATED ORAL at 21:16

## 2018-12-06 RX ADMIN — Medication 5 ML: at 21:16

## 2018-12-06 RX ADMIN — IOPAMIDOL 130 ML: 755 INJECTION, SOLUTION INTRAVENOUS at 16:14

## 2018-12-06 RX ADMIN — Medication 10 ML: at 06:00

## 2018-12-06 RX ADMIN — MIDAZOLAM HYDROCHLORIDE 1 MG: 1 INJECTION, SOLUTION INTRAMUSCULAR; INTRAVENOUS at 15:53

## 2018-12-06 RX ADMIN — HEPARIN SODIUM 4000 UNITS: 10000 INJECTION INTRAVENOUS; SUBCUTANEOUS at 15:46

## 2018-12-06 RX ADMIN — MIDAZOLAM HYDROCHLORIDE 2 MG: 1 INJECTION, SOLUTION INTRAMUSCULAR; INTRAVENOUS at 15:31

## 2018-12-06 RX ADMIN — TICAGRELOR 180 MG: 90 TABLET ORAL at 15:50

## 2018-12-06 RX ADMIN — FENTANYL CITRATE 25 MCG: 50 INJECTION, SOLUTION INTRAMUSCULAR; INTRAVENOUS at 15:31

## 2018-12-06 NOTE — ED NOTES
TRANSFER - OUT REPORT: 
 
Verbal report given to Jeff Daugherty RN (name) on Jaxon Alexander  being transferred to ICU (unit) for routine progression of care Report consisted of patients Situation, Background, Assessment and  
Recommendations(SBAR). Information from the following report(s) SBAR was reviewed with the receiving nurse. Lines:  
Peripheral IV 12/05/18 Left Antecubital (Active) Site Assessment Clean, dry, & intact 12/5/2018  5:05 PM  
Phlebitis Assessment 0 12/5/2018  5:05 PM  
Infiltration Assessment 0 12/5/2018  5:05 PM  
Dressing Status Clean, dry, & intact 12/5/2018  5:05 PM  
Dressing Type Tape;Transparent 12/5/2018  5:05 PM  
Hub Color/Line Status Green 12/5/2018  5:05 PM  
Action Taken Blood drawn 12/5/2018  5:05 PM  
  
 
Opportunity for questions and clarification was provided. Patient transported with: 
 Monitor Registered Nurse

## 2018-12-06 NOTE — PROGRESS NOTES
Care Management Interventions Mode of Transport at Discharge: Other (see comment) Transition of Care Consult (CM Consult): Other Current Support Network: Lives with Spouse Confirm Follow Up Transport: Family Plan discussed with Pt/Family/Caregiver: Yes Freedom of Choice Offered: Yes Discharge Location Discharge Placement: Home Saw pt in interdisciplinarily rounds, plan of care and discharge date/ location discussed. Per the hospitalitis plans are for pt to be transferred DT [address] [place] [address] for a heart cath and be d/c from there at that point. House supervisor aware. Chart screened by  for discharge planning. No needs identified at this time. Please consult  if any new issues arise.

## 2018-12-06 NOTE — PROGRESS NOTES
Spoke with Ana Calvillo, cath lab RN. Patient to transfer downtown for heart cath this afternoon.  Patient to be NPO after breakfast.

## 2018-12-06 NOTE — H&P
[facility] CARDIOLOGY PROGRESS NOTE 
      
 
12/6/2018 5:27 PM 
 
Admit Date: 12/6/2018 Subjective: Please refer to H&P dictated on 12/5/18 by Dr Estrella Uribe for full details. Xfer to [facility] today for [facility]. S/p PCI to [facility], LVEF preserved. Starting ASA/statin/brilinta/arb. ROS: 
Cardiovascular:  As noted above Objective:  
  
Vitals:  
 12/06/18 1423 12/06/18 1614 12/06/18 1652 BP: 140/82  142/73 Pulse: (!) 59 (!) 54 83 Resp: 21 16 17 Temp: 99 °F (37.2 °C)  98.6 °F (37 °C) SpO2: 96%  93% Weight: 87.5 kg (192 lb 12.8 oz) Height: 5' 10\" (1.778 m) Physical Exam: 
General: Well Developed, Well Nourished, No Acute Distress, Alert & Oriented x 3, Appropriate mood Neck: supple, no JVD Heart: S1S2 with RRR without murmurs or gallops Lungs: Clear throughout auscultation bilaterally without adventitious sounds Abd: soft, nontender, nondistended, with good bowel sounds Ext: no edema bilaterally Skin: warm and dry Data Review:  
Recent Labs 12/05/18 
1658   
K 3.8 BUN 19  
CREA 0.99 * WBC 8.3 HGB 16.2 HCT 48.3  INR 0.9 CHOL 158 LDLC 79.2 HDL 38* Recent Labs 12/05/18 
1653 TNIPOC 0.46* Assessment/Plan: Active Problems: 
  NSTEMI (non-ST elevated myocardial infarction) ([facility] [address] (12/5/2018) 1) CAD - DAPT x 12 months, statin 2) Lipids - atorvastatin 80 mg 
3) HTN - ARB 4) NSTEMI - hold BB with bradycardia Basil Rizvi MD 
12/6/2018 5:27 PM

## 2018-12-06 NOTE — PROGRESS NOTES
Patient to tele room via stretcher from Cath lab post [facility]. Patient transferred to bed via sheet slide. Telebox reapplied and [place] at bedside with BP cycling every 15 minutes. Gtts running: NS at continued at 75 ml/hr. TR band in place. R radial site without hematoma and without bleeding. Site clean, dry, and intact. Able to remove air 1745. Patient voiced understanding of keeping wrist immobilized. Instructed patient to not use arm for any pushing or pulling. Patient aware to use call light to communicate needs which is in reach of the patient. Patient verbalizes understanding.

## 2018-12-06 NOTE — PROGRESS NOTES
Alma Lin transported patient out with Heparin gtt infusing. Patient's wife has all of patient's belongings.

## 2018-12-06 NOTE — PROCEDURES
Brief Cardiac Procedure Note    Patient: Sixto Osullivan MRN: [id number]  SSN: [id number]    YOB: 1955  Age: 60 y.o. Sex: male      Date of Procedure: 12/6/2018     Pre-procedure Diagnosis: NSTEMI    Post-procedure Diagnosis: Coronary Artery Disease    Reason for Procedure: ACS > 24 Hours    Procedure: Left Heart Catheterization with Percutaneous Coronary Intervention    Brief Description of Procedure: LHC via R radial artery, PCI to [facility]    Performed By: Bob Maldonado MD     Assistants: None    Anesthesia: Moderate Sedation    Estimated Blood Loss: Less than 10 mL      Specimens: None    Implants: None    Findings: LM normal, LAD luminal irregs, Circ 95%->0% with ASHISH, 50% mRCA small non-dominant (less than 2 mm vessel) LVEF 55-60%. LVEDP 28 mmHg    Complications: None    Recommendations: Loaded with brilinta on the table, continue asa, stop hep gtt, continue BB, statin.     Signed By: Bob Maldonado MD     December 6, 2018

## 2018-12-06 NOTE — PROGRESS NOTES
Patient arrived from ED. A&O4, follows commands, eyes focus and track. NSR with occasional PVCs on monitor, S1/S2 auscultated. Nitro paste to L chest. Breath sounds clear, symmetrical chest expansion on room air. Bowel sounds active, abdomen intact and soft. Dual skin assessment completed with Nathanael Cardona RN. Skin intact, no breakdown to sacrum or heels. Lines: 18G L AC Drips: heparin @ 12 Patient denies any pain or needs at this time.

## 2018-12-06 NOTE — H&P
[address] [facility] HISTORY AND PHYSICAL Lita Deshpande 
MR#: [id number] : 1955 ACCOUNT #: [de-identified] ADMIT DATE: 2018 HISTORY OF PRESENT ILLNESS:  This is a 57-year-old gentleman admitted to the hospital with non-ST segment elevation MI. He has not felt well for the last month with fatigue. Last week he was having chest pain off and on. Today, he had prolonged chest pain like a weight across his precordial chest with associated neck pain. Symptoms were enough to provoke him to come to the emergency room. He has felt better after being admitted to the emergency room. His  initial EKG showed nonspecific ST segment changes. His initial troponin is elevated at 0.4. He has no past cardiac history. PAST MEDICAL HISTORY:  Remarkable for a cholecystectomy. MEDICATIONS:  At home are Sonata, Imitrex and Advil. FAMILY HISTORY:  Reveals his mother to have  from a heart attack at 67. SOCIAL HISTORY:  Reveals him to be a smoker at least one-half pack per day of cigars. He drinks occasional alcohol. He is an . REVIEW OF SYSTEMS: Performed and is otherwise negative. He had a colonoscopy and esophageal dilatation recently. He has not had any fever, chills, diplopia, tinnitus, epistaxis, abdominal pain, melena, flank pain, dysuria, rash, adenopathy or signs or symptoms of stroke. PHYSICAL EXAMINATION: 
VITAL SIGNS:  Stable and normal. 
GENERAL:  This is a well-developed, well-nourished gentleman in no acute distress. HEENT:  Negative. There is no jaundice or drainage. NECK:  Veins flat. Carotids negative. Thyroid is not enlarged. LUNGS:  Clear. HEART:  Reveals a regular rate and rhythm. There is no murmur, gallop or rub. ABDOMEN:  Soft. No mass. EXTREMITIES:  Pulses intact. NECK:  No mass. No tenderness. NEUROLOGIC:  Grossly negative. MUSCULOSKELETAL:  Grossly negative. GENITAL AND RECTAL:  Not performed. His EKG, as noted above shows nonspecific changes. LABORATORY DATA:  Other than the elevated troponin, is generally negative. His renal function is normal.  A lipid profile a year ago showed elevated triglycerides at 388, cholesterol 154, HDL was low at 27. IMPRESSION: 
1.  Non-ST segment elevation myocardial infarction. 2.  Cigar smoker. PLAN:  The patient will be admitted and stabilized and undergo cardiac catheterization. MD BETO Riojas/[place] 
D: 12/05/2018 21:51    
T: 12/05/2018 22:37 JOB #: [id number]

## 2018-12-06 NOTE — PROGRESS NOTES
Report called to [facility]. Patient is to transfer to room [address] [place] [address]. [facility] being called to transfer patient.

## 2018-12-06 NOTE — PROGRESS NOTES
TRANSFER - IN REPORT: 
 
Verbal report received from [address], RN(name) on Jsoselin Foots  To be received from ED(unit) for routine progression of care Report consisted of patients Situation, Background, Assessment and  
Recommendations(SBAR). Information from the following report(s) SBAR, ED Summary, [facility] and Cardiac Rhythm NSR was reviewed with the receiving nurse. Opportunity for questions and clarification was provided. Assessment to be completed upon patients arrival to unit and care assumed.

## 2018-12-06 NOTE — PROGRESS NOTES
TRANSFER - IN REPORT: 
 
Verbal report received from Chrystal Bran RN on Luisa Silveira being received from  ICU for routine progression of care Report consisted of patients Situation, Background, Assessment and Recommendations(SBAR). Information from the following report(s) SBAR, Kardex, Procedure Summary and Cardiac Rhythm NSR was reviewed with the receiving nurse. Opportunity for questions and clarification was provided. Assessment completed upon patients arrival to unit and care assumed.

## 2018-12-06 NOTE — PROGRESS NOTES
TRANSFER - IN REPORT: 
 
Verbal report received from  Ruddy Najjar, RN on Ebb Papa being received from Cath Lab for routine progression of care Report consisted of patients Situation, Background, Assessment and Recommendations(SBAR). Information from the following report(s) SBAR, Kardex and MAR was reviewed with the receiving nurse. Opportunity for questions and clarification was provided. Assessment completed upon patients arrival to unit and care assumed.

## 2018-12-06 NOTE — PROGRESS NOTES
Patient to tele room via stretcher from EMS as [facility]. Patient transferred to bed via ambulation. Steady gait noted. Pt needs assist with IV pole only. Patient placed in gown and monitor leads applied. Pt is alert and oriented x4, calm, cooperative and follows commands appropriately. Patient presently without CP, SOB, or n/v. No present complaints reported from patient. LS diminished. Breathing even, regular, and non-labored. NSR via telemetry, without ectopy. Negative for edema in extremities. VSS. No distress noted. gtts running: Heparin 18 u Dual skin assessment with secondary RN. Skin is overall intact. No heel breakdown. No sacral breakdown. Pt repositioned in bed and turns self appropriately. Cath lab here to take patient. Admission database to be completed upon his return to the floor. Patient oriented to room and floor, no questions voiced at this time. Plan of care reviewed. Patient voiced understanding to use call light to communicate needs. 12/06/18 1416 Skin Integumentary Skin Integumentary (WDL) WDL Pressure  Injury Documentation No Pressure Injury Noted-Pressure Ulcer Prevention Initiated Wound Prevention and Protection Methods Orientation of Wound Prevention Posterior;Mid  
Location of Wound Prevention Sacrum/Coccyx Dressing Present  No  
Wound Offloading (Prevention Methods) Repositioning

## 2018-12-06 NOTE — PROGRESS NOTES
TRANSFER - OUT REPORT: 
 
Verbal report given to neris granados(name) on Basilia Arce  being transferred to tele(unit) for routine progression of care Report consisted of patients Situation, Background, Assessment and  
Recommendations(SBAR). Information from the following report(s) SBAR was reviewed with the receiving nurse. Opportunity for questions and clarification was provided. Procedure: [facility]   Finding Summary: stent to circ(cath/pci/pacer settings) Location: right wrist    Closure Device: tr band 14ml(yes/no/description) Post Site Assessment: no bleeding or hematoma Pre Procedure Meds:(if any) Intra Procedure Meds: 
 
Versed: 3mg Fentanyl: 50mcg Heparin: 9,000 units Angiomax Stop Time: na 
Reopro:  na Integrelin: na Antiplatelet: brilinta 795PT Peripheral IV 12/05/18 Left Antecubital (Active) Site Assessment Clean, dry, & intact 12/6/2018  2:16 PM  
Phlebitis Assessment 0 12/6/2018  2:16 PM  
Infiltration Assessment 0 12/6/2018  2:16 PM  
Dressing Status Clean, dry, & intact 12/6/2018  2:16 PM  
Dressing Type Tape;Transparent 12/6/2018  2:16 PM  
Hub Color/Line Status Patent 12/6/2018  2:16 PM  
Action Taken Blood drawn 12/5/2018  5:05 PM  
Alcohol Cap Used No 12/6/2018  7:30 AM  
 
  
Post-Procedure Site Assessment (1) Wound Type: Catheter entry/exit Location: Wrist 
Orientation : Right Hemostasis : TR Band(14ml) Site Assessment: No bleeding, No hematoma, Dry/intact 
  
  
  
  
  
  
  
has No Known Allergies. Past Medical History:  
Diagnosis Date  Abnormal EKG  Coronary atherosclerosis of native coronary vessel 10/14/2015  Difficulty swallowing x 1 yr--   
 not every day---but getting worse  GERD (gastroesophageal reflux disease)   
 has had an esophageal stricture dilated twice in past.  Last about 2 years ago  Helicobacter pylori gastritis 2012  Hyperlipidemia other unsp dyslipidemia 10/14/2015  Obese   
 bmi=31  Stricture of esophagus 2012  Tobacco use disorder 10/14/2015 Visit Vitals /82 Pulse (!) 54 Temp 99 °F (37.2 °C) Resp 16 Ht 5' 10\" (1.778 m) Wt 87.5 kg (192 lb 12.8 oz) SpO2 96% BMI 27.66 kg/m²

## 2018-12-07 VITALS
RESPIRATION RATE: 18 BRPM | BODY MASS INDEX: 26.92 KG/M2 | SYSTOLIC BLOOD PRESSURE: 162 MMHG | HEIGHT: 70 IN | TEMPERATURE: 98.1 F | HEART RATE: 71 BPM | WEIGHT: 188 LBS | OXYGEN SATURATION: 97 % | DIASTOLIC BLOOD PRESSURE: 84 MMHG

## 2018-12-07 LAB
ANION GAP SERPL CALC-SCNC: 7 MMOL/L (ref 7–16)
ATRIAL RATE: 68 BPM
ATRIAL RATE: 69 BPM
BASOPHILS # BLD: 0.1 K/UL (ref 0–0.2)
BASOPHILS NFR BLD: 1 % (ref 0–2)
BUN SERPL-MCNC: 15 MG/DL (ref 8–23)
CALCIUM SERPL-MCNC: 8.5 MG/DL (ref 8.3–10.4)
CALCULATED P AXIS, ECG09: 69 DEGREES
CALCULATED P AXIS, ECG09: 74 DEGREES
CALCULATED R AXIS, ECG10: -49 DEGREES
CALCULATED R AXIS, ECG10: -62 DEGREES
CALCULATED T AXIS, ECG11: 25 DEGREES
CALCULATED T AXIS, ECG11: 7 DEGREES
CHLORIDE SERPL-SCNC: 107 MMOL/L (ref 98–107)
CO2 SERPL-SCNC: 25 MMOL/L (ref 21–32)
CREAT SERPL-MCNC: 0.98 MG/DL (ref 0.8–1.5)
DIAGNOSIS, 93000: NORMAL
DIAGNOSIS, 93000: NORMAL
DIFFERENTIAL METHOD BLD: NORMAL
EOSINOPHIL # BLD: 0.1 K/UL (ref 0–0.8)
EOSINOPHIL NFR BLD: 1 % (ref 0.5–7.8)
ERYTHROCYTE [DISTWIDTH] IN BLOOD BY AUTOMATED COUNT: 13 % (ref 11.9–14.6)
GLUCOSE SERPL-MCNC: 85 MG/DL (ref 65–100)
HCT VFR BLD AUTO: 44.9 % (ref 41.1–50.3)
HGB BLD-MCNC: 15.1 G/DL (ref 13.6–17.2)
IMM GRANULOCYTES # BLD: 0 K/UL (ref 0–0.5)
IMM GRANULOCYTES NFR BLD AUTO: 0 % (ref 0–5)
LYMPHOCYTES # BLD: 2.1 K/UL (ref 0.5–4.6)
LYMPHOCYTES NFR BLD: 20 % (ref 13–44)
MCH RBC QN AUTO: 32.3 PG (ref 26.1–32.9)
MCHC RBC AUTO-ENTMCNC: 33.6 G/DL (ref 31.4–35)
MCV RBC AUTO: 95.9 FL (ref 79.6–97.8)
MONOCYTES # BLD: 1.1 K/UL (ref 0.1–1.3)
MONOCYTES NFR BLD: 11 % (ref 4–12)
NEUTS SEG # BLD: 7.2 K/UL (ref 1.7–8.2)
NEUTS SEG NFR BLD: 67 % (ref 43–78)
NRBC # BLD: 0 K/UL (ref 0–0.2)
P-R INTERVAL, ECG05: 172 MS
P-R INTERVAL, ECG05: 184 MS
PLATELET # BLD AUTO: 219 K/UL (ref 150–450)
PMV BLD AUTO: 10.6 FL (ref 9.4–12.3)
POTASSIUM SERPL-SCNC: 3.5 MMOL/L (ref 3.5–5.1)
Q-T INTERVAL, ECG07: 422 MS
Q-T INTERVAL, ECG07: 424 MS
QRS DURATION, ECG06: 80 MS
QRS DURATION, ECG06: 82 MS
QTC CALCULATION (BEZET), ECG08: 450 MS
QTC CALCULATION (BEZET), ECG08: 452 MS
RBC # BLD AUTO: 4.68 M/UL (ref 4.23–5.6)
SODIUM SERPL-SCNC: 139 MMOL/L (ref 136–145)
VENTRICULAR RATE, ECG03: 68 BPM
VENTRICULAR RATE, ECG03: 69 BPM
WBC # BLD AUTO: 10.7 K/UL (ref 4.3–11.1)

## 2018-12-07 PROCEDURE — 80048 BASIC METABOLIC PNL TOTAL CA: CPT

## 2018-12-07 PROCEDURE — 93005 ELECTROCARDIOGRAM TRACING: CPT | Performed by: INTERNAL MEDICINE

## 2018-12-07 PROCEDURE — 74011250637 HC RX REV CODE- 250/637: Performed by: INTERNAL MEDICINE

## 2018-12-07 PROCEDURE — 36415 COLL VENOUS BLD VENIPUNCTURE: CPT

## 2018-12-07 PROCEDURE — 85025 COMPLETE CBC W/AUTO DIFF WBC: CPT

## 2018-12-07 RX ORDER — ATORVASTATIN CALCIUM 80 MG/1
80 TABLET, FILM COATED ORAL
Qty: 30 TAB | Refills: 6 | Status: SHIPPED | OUTPATIENT
Start: 2018-12-07 | End: 2019-09-09 | Stop reason: SDUPTHER

## 2018-12-07 RX ORDER — METOPROLOL TARTRATE 25 MG/1
25 TABLET, FILM COATED ORAL 2 TIMES DAILY
Qty: 60 TAB | Refills: 11 | Status: SHIPPED | OUTPATIENT
Start: 2018-12-07 | End: 2019-06-24

## 2018-12-07 RX ORDER — LOSARTAN POTASSIUM 25 MG/1
25 TABLET ORAL DAILY
Qty: 30 TAB | Refills: 6 | Status: SHIPPED | OUTPATIENT
Start: 2018-12-07 | End: 2019-03-18

## 2018-12-07 RX ORDER — GUAIFENESIN 100 MG/5ML
81 LIQUID (ML) ORAL DAILY
Qty: 30 TAB | Refills: 11 | Status: SHIPPED | OUTPATIENT
Start: 2018-12-07

## 2018-12-07 RX ORDER — NITROGLYCERIN 0.4 MG/1
0.4 TABLET SUBLINGUAL
Qty: 1 BOTTLE | Refills: 3 | Status: SHIPPED | OUTPATIENT
Start: 2018-12-07 | End: 2021-08-03 | Stop reason: SDUPTHER

## 2018-12-07 RX ADMIN — Medication 10 ML: at 06:02

## 2018-12-07 RX ADMIN — TICAGRELOR 90 MG: 90 TABLET ORAL at 06:02

## 2018-12-07 RX ADMIN — LOSARTAN POTASSIUM 25 MG: 25 TABLET ORAL at 09:08

## 2018-12-07 RX ADMIN — ASPIRIN 81 MG 81 MG: 81 TABLET ORAL at 09:08

## 2018-12-07 NOTE — PROGRESS NOTES
Verbal bedside report received from [address]. Patient's situation, background, assessment and recommendations were provided. Kardex, Mar, and recent results also reviewed. Opportunity for questions provided. Assumed care of patient.

## 2018-12-07 NOTE — PROGRESS NOTES
Discharge instructions were reviewed with patient. An opportunity was given for questions. All medications were reviewed, and information was given on the new medications - aspirin, lipitor, losartan, nitrostat, brilinta, lopressor. Patient verbalized understanding, and has no questions at this time. IV and telemetry monitor removed by primary RN.

## 2018-12-07 NOTE — DISCHARGE SUMMARY
[facility] Discharge Summary     Patient ID:  Gunnar Spatz  [id number]  44 y.o.  1955    Admit date: 12/6/2018    Discharge date: 12/7/18    Admitting Physician: Lorena Lara MD     Discharge Physician: Romana Mandes, PA/Dr. Meme Osman    Admission Diagnoses: Chest Pain  NSTEMI (non-ST elevated myocardial infarction) [facility])    Discharge Diagnoses:   Patient Active Problem List    Diagnosis Date Noted    Unstable angina ([facility] [address] [address] 12/05/2018    NSTEMI (non-ST elevated myocardial infarction) ([facility] [address] 12/05/2018    Tobacco use disorder 10/14/2015    Coronary atherosclerosis of native coronary vessel 10/14/2015    Hyperlipidemia other unsp dyslipidemia 10/14/2015       Cardiology Procedures this admission:  Left heart catheterization with PCI  Consults: None    Hospital Course: Patient presented to the emergency department of [address] with complaints of CP x one month, worse the day of admission radiating to his neck. Patient was evaluated and subsequently admitted for further cardiac evaluation and treatment. Cardiac enzymes were positive at .4. He was started on heparin. [facility] was planned for 12-6-18. Patient was transferred downtown and underwent cardiac catheterization by Dr. Meme Osman and was found to have a 95% stenosis of the circ that was stented with a  3.5 x 18 Xience drug-eluting stent with 0% residual stenosis. EF 55-60%. Patient tolerated the procedure well and returned to the telemetry floor for recovery. The morning of 12/7/2018 patient was up feeling well without any complaints of chest pain or shortness of breath. Patient's right radial cath site was clean, dry and intact without hematoma or bruit. Patient's labs were WNL. Patient was seen and examined by Dr. Meme Osman and determined stable and ready for discharge. Patient was instructed on the importance of medication compliance including taking aspirin and Brilinta everyday without missing a dose.   After receiving drug eluting stents, the patient will need to be on dual anti-platelet therapy for at least 1 year. For maximized medical therapy of CAD, patient will continue use of ARB and statin. No BB due to bradycardia. The patient will follow up with [facility]  and has been referred to cardiac rehab. DISPOSITION: The patient is being discharged home in stable condition on a low saturated fat, low cholesterol and low salt diet. The patient is instructed to advance activities as tolerated to the limit of fatigue or shortness of breath. The patient is instructed to avoid all heavy lifting for 5 days. The patient is instructed to watch the cath site for bleeding/oozing; if seen, the patient is instructed to apply firm pressure with a clean cloth and call [facility] at [phone]. The patient is instructed to watch for signs of infection which include: increasing area of redness, fever/hot to touch or purulent drainage at the catheterization site. The patient is instructed not to soak in a bathtub for 7-10 days, but is cleared to shower. The patient is instructed to call the office or return to the ER for immediate evaluation for any shortness of breath or chest pain not relieved by NTG. Discharge Exam:   Visit Vitals  /73   Pulse 83   Temp 98.6 °F (37 °C)   Resp 17   Ht 5' 10\" (1.778 m)   Wt 87.5 kg (192 lb 12.8 oz)   SpO2 93%   BMI 27.66 kg/m²     Patient has been seen by Dr. Zaki Tobias: see his progress note for exam details.     Recent Results (from the past 24 hour(s))   PTT    Collection Time: 12/06/18  1:29 AM   Result Value Ref Range    aPTT 41.5 (H) 24.7 - 39.8 SEC   PTT    Collection Time: 12/06/18  8:34 AM   Result Value Ref Range    aPTT 69.1 (H) 24.7 - 39.8 SEC   POC ACTIVATED CLOTTING TIME    Collection Time: 12/06/18  4:00 PM   Result Value Ref Range    Activated Clotting Time (POC) 285 (H) 70 - 128 SECS   EKG, 12 LEAD, INITIAL    Collection Time: 12/06/18  7:00 PM   Result Value Ref Range Ventricular Rate 69 BPM    Atrial Rate 69 BPM    P-R Interval 184 ms    QRS Duration 80 ms    Q-T Interval 422 ms    QTC Calculation (Bezet) 452 ms    Calculated P Axis 69 degrees    Calculated R Axis -62 degrees    Calculated T Axis 7 degrees    Diagnosis       Normal sinus rhythm  Left axis deviation  Abnormal ECG  When compared with ECG of 06-DEC-2018 18:56,  Previous ECG has undetermined rhythm, needs review           Patient Instructions:     Current Discharge Medication List      START taking these medications    Details   aspirin 81 mg chewable tablet Take 1 Tab by mouth daily. Qty: 30 Tab, Refills: 11      atorvastatin (LIPITOR) 80 mg tablet Take 1 Tab by mouth nightly. Qty: 30 Tab, Refills: 6      losartan (COZAAR) 25 mg tablet Take 1 Tab by mouth daily. Qty: 30 Tab, Refills: 6      nitroglycerin (NITROSTAT) 0.4 mg SL tablet 1 Tab by SubLINGual route every five (5) minutes as needed for Chest Pain. Up to 3 doses. Qty: 1 Bottle, Refills: 3      ticagrelor (BRILINTA) 90 mg tablet Take 1 Tab by mouth every twelve (12) hours every twelve (12) hours. Qty: 60 Tab, Refills: 11      metoprolol tartrate (LOPRESSOR) 25 mg tablet Take 1 Tab by mouth two (2) times a day for 360 days. Qty: 60 Tab, Refills: 11         CONTINUE these medications which have NOT CHANGED    Details   zaleplon (SONATA) 10 mg capsule Take 1 Cap by mouth nightly. Max Daily Amount: 10 mg.  Qty: 30 Cap, Refills: 5    Associated Diagnoses: Primary insomnia      SUMAtriptan (IMITREX) 100 mg tablet Take 1 Tab by mouth once as needed for Migraine.   Qty: 30 Tab, Refills: 5    Associated Diagnoses: Chronic migraine without aura without status migrainosus, not intractable         STOP taking these medications       ibuprofen (ADVIL) 200 mg tablet Comments:   Reason for Stopping:

## 2018-12-07 NOTE — PROGRESS NOTES
Radial compression band removed at 2119 after slowly reducing air from 12 cc to zero as per hospital protocol. No bleeding or hematoma noted. 2 x 2 gauze with tegaderm placed over puncture site. The affected extremity is warm and dry to the touch. Frequent vital signs documented per flowsheet. Patient instructed to call if any bleeding noted on gauze. Patient verbalized understanding the nursing instructions.

## 2018-12-07 NOTE — PROGRESS NOTES
Cardiac Rehab: Spoke with patient regarding referral to cardiac rehab. Patient meets admission criteria based on PCI (12/6/18). Written information about Cardiac Rehab given and reviewed with patient. Discussed lifestyle modifications to promote cardiac wellness. Patient indicated that he does not want to participate in the cardiac rehab program due to his conflicting work schedule. The pt is aware that his referral is valid for six months following his procedure. His Cardiologist is Dr. Deloris Isbell. Thank you, BRIANNA Boo, RN Cardiopulmonary Rehabilitation Nurse Liaison [facility]

## 2018-12-07 NOTE — PROGRESS NOTES
Verbal bedside report given to [facility] rashard ACUNA RN. Patient's situation, background, assessment and recommendations provided. Kardex, Mar, and recent results also reviewed. Opportunity for questions provided. Oncoming RN assumed care of patient. Right radial cath site CDI. No bleeding. No hematoma. Palpable radial pulses. 4cc left in TR band. Voiding appropriately. Reviewed right arm limitations. Patient instructed to keep right straight without pushing/pulling with the extremity. Patient verbalizes understanding. Call light in reach.

## 2018-12-07 NOTE — PROGRESS NOTES
Bedside and Verbal shift change report given to self (oncoming nurse) by Gabriel Esteban RN (offgoing nurse). Report included the following information SBAR, Kardex, ED Summary, Procedure Summary, Intake/Output, MAR, Recent Results and Cardiac Rhythm SR. TR Band with 4 mls on right radial site

## 2018-12-07 NOTE — PROGRESS NOTES
Care Management Interventions PCP Verified by CM: Yes(September 2018) Palliative Care Criteria Met (RRAT>21 & CHF Dx)?: No(RRAT 10 DX NSTEMI) Mode of Transport at Discharge: Other (see comment)(wife) Transition of Care Consult (CM Consult): Discharge Planning Discharge Durable Medical Equipment: No(has walker, and BSC at home that was his mother's) Physical Therapy Consult: No 
Occupational Therapy Consult: No 
Speech Therapy Consult: No 
Current Support Network: Lives with Spouse Confirm Follow Up Transport: Self Plan discussed with Pt/Family/Caregiver: Yes Freedom of Choice Offered: Yes Discharge Location Discharge Placement: Home Met with patient for d/c planning. Patient alert and oriented x 3, independent of ADL's and lives with his wife. He works at [employer] on [address]. He is able to drive and uses no DME but does have walker and BSC that were his mother's. He has [employer] and has $5000 deductible for year before it will cover medications. Discussed options for medications, given information on [employer] and that if he is unable to obtain his medications to notify physician first. Patient will be given [address] for month coupon. Patient to d/c home with wife.

## 2018-12-07 NOTE — PROGRESS NOTES
Left paperwork for directives with patient for review Yaneth Mojica, staff [address]avo [address], [id number] [facility]  /   [email or web address]

## 2018-12-07 NOTE — DISCHARGE INSTRUCTIONS
DISPOSITION: The patient is being discharged home in stable condition on a low saturated fat, low cholesterol and low salt diet. The patient is instructed to advance activities as tolerated to the limit of fatigue or shortness of breath. The patient is instructed to avoid all heavy lifting for 5 days. The patient is instructed to watch the cath site for bleeding/oozing; if seen, the patient is instructed to apply firm pressure with a clean cloth and call [facility] at [phone]. The patient is instructed to watch for signs of infection which include: increasing area of redness, fever/hot to touch or purulent drainage at the catheterization site. The patient is instructed not to soak in a bathtub for 7-10 days, but is cleared to shower. The patient is instructed to call the office or return to the ER for immediate evaluation for any shortness of breath or chest pain not relieved by NTG. Percutaneous Coronary Intervention: What to Expect at [facility]    Percutaneous coronary intervention (PCI) is the name for procedures that are used to open a narrowed or blocked coronary artery. The two most common PCI procedures are coronary angioplasty and coronary stent placement. Your groin or arm may have a bruise and feel sore for a day or two after a percutaneous coronary intervention (PCI). You can do light activities around the house, but nothing strenuous for several days. This care sheet gives you a general idea about how long it will take for you to recover. But each person recovers at a different pace. Follow the steps below to get better as quickly as possible. How can you care for yourself at home? Activity    · If the doctor gave you a sedative:  ? For 24 hours, don't do anything that requires attention to detail. It takes time for the medicine's effects to completely wear off.  ? For your safety, do not drive or operate any machinery that could be dangerous.  Wait until the medicine wears off and you can think clearly and react easily.     · Do not do strenuous exercise and do not lift, pull, or push anything heavy until your doctor says it is okay. This may be for a day or two. You can walk around the house and do light activity, such as cooking.     · If the catheter was placed in your groin, try not to walk up stairs for the first couple of days.     · If the catheter was placed in your arm near your wrist, do not bend your wrist deeply for the first couple of days. Be careful using your hand to get into and out of a chair or bed.     · Carry your stent identification card with you at all times.     · If your doctor recommends it, get more exercise. Walking is a good choice. Bit by bit, increase the amount you walk every day. Try for at least 30 minutes on most days of the week. Diet    · Drink plenty of fluids to help your body flush out the dye. If you have kidney, heart, or liver disease and have to limit fluids, talk with your doctor before you increase the amount of fluids you drink.     · Keep eating a heart-healthy diet that has lots of fruits, vegetables, and whole grains. If you have not been eating this way, talk to your doctor. You also may want to talk to a dietitian. This expert can help you to learn about healthy foods and plan meals. Medicines    · Your doctor will tell you if and when you can restart your medicines. He or she will also give you instructions about taking any new medicines.     · If you take blood thinners, such as warfarin (Coumadin), clopidogrel (Plavix), or aspirin, be sure to talk to your doctor. He or she will tell you if and when to start taking those medicines again. Make sure that you understand exactly what your doctor wants you to do.     · Your doctor will prescribe blood-thinning medicines. You will likely take aspirin plus another antiplatelet, such as clopidogrel (Plavix). It is very important that you take these medicines exactly as directed.  These medicines help keep the coronary artery open and reduce your risk of a heart attack.     · Call your doctor if you think you are having a problem with your medicine.    Care of the catheter site    · For 1 or 2 days, keep a bandage over the spot where the catheter was inserted. The bandage probably will fall off in this time.     · Put ice or a cold pack on the area for 10 to 20 minutes at a time to help with soreness or swelling. Put a thin cloth between the ice and your skin.     · You may shower 24 to 48 hours after the procedure, if your doctor okays it. Pat the incision dry.     · Do not soak the catheter site until it is healed. Don't take a bath for 1 week, or until your doctor tells you it is okay.     · If you are bleeding, lie down and press on the area for 15 minutes to try to make it stop. If the bleeding does not stop, call your doctor or seek immediate medical care. Follow-up care is a key part of your treatment and safety. Be sure to make and go to all appointments, and call your doctor if you are having problems. It's also a good idea to know your test results and keep a list of the medicines you take. When should you call for help? Call 911 anytime you think you may need emergency care. For example, call if:    · You passed out (lost consciousness).     · You have severe trouble breathing.     · You have sudden chest pain and shortness of breath, or you cough up blood.     · You have symptoms of a heart attack, such as:  ? Chest pain or pressure. ? Sweating. ? Shortness of breath. ? Nausea or vomiting. ? Pain that spreads from the chest to the neck, jaw, or one or both shoulders or arms. ? Dizziness or lightheadedness. ? A fast or uneven pulse. After calling 911, chew 1 adult-strength aspirin. Wait for an ambulance.  Do not try to drive yourself.     · You have been diagnosed with angina, and you have angina symptoms that do not go away with rest or are not getting better within 5 minutes after you take one dose of nitroglycerin.    Call your doctor now or seek immediate medical care if:    · You are bleeding from the area where the catheter was put in your artery.     · You have a fast-growing, painful lump at the catheter site.     · You have signs of infection, such as:  ? Increased pain, swelling, warmth, or redness. ? Red streaks leading from the catheter site. ? Pus draining from the catheter site. ? A fever.     · Your leg or arm looks blue or feels cold, numb, or tingly.    Watch closely for changes in your health, and be sure to contact your doctor if you have any problems. Where can you learn more? Go to http://damon-abby.info/. Enter Z867 in the search box to learn more about \"Percutaneous Coronary Intervention: What to Expect at Home. \"  Current as of: December 6, 2017  Content Version: 11.8  © 7875-0275 [employer]. Care instructions adapted under license by [employer] (which disclaims liability or warranty for this information). If you have questions about a medical condition or this instruction, always ask your healthcare professional. [place] [address] any warranty or liability for your use of this information. Cardiac Catheterization/Angiography Discharge Instructions    *Check the puncture site frequently for swelling or bleeding. If you see any bleeding, lie down and apply pressure over the area with a clean town or washcloth. Notify your doctor for any redness, swelling, drainage or oozing from the puncture site. Notify your doctor for any fever or chills. *If the leg or arm with the puncture becomes cold, numb or painful, call [facility] at  [phone]. *Activity should be limited for the next 48 hours. Climb stairs as little as possible and avoid any stooping, bending or strenuous activity for 48 hours.  No heavy lifting (anything over 10 pounds) for three days. *Do not drive for 48 hours. *You may resume your usual diet. Drink more fluids than usual.    *Have a responsible person drive you home and stay with you for at least 24 hours after your heart catheterization/angiography. *You may remove the bandage from your Right Arm in 24 hours. You may shower in 24 hours. No tub baths, hot tubs or swimming for one week. Do not place any lotions, creams, powders, ointments over the puncture site for one week. You may place a clean band-aid over the puncture site each day for 5 days. Change this daily. Heart Attack: Care Instructions  Your Care Instructions    A heart attack (myocardial infarction, or MI) occurs when one or more of the coronary arteries, which supply the heart with oxygen-rich blood, is blocked. A blockage usually occurs when plaque inside the artery breaks open and a blood clot forms in the artery. After a heart attack, you may be worried about your future. Over the next several weeks, your heart will start to heal. Though it can be hard to break old habits, you can prevent another heart attack by making some lifestyle changes and by taking medicines. You may use this information for ideas about what to do at home to speed your recovery. Follow-up care is a key part of your treatment and safety. Be sure to make and go to all appointments, and call your doctor if you are having problems. It's also a good idea to know your test results and keep a list of the medicines you take. How can you care for yourself at home? Activity    · Until your doctor says it is okay, do not do strenuous exercise. And do not lift, pull, or push anything heavy. Ask your doctor what types of activities are safe for you.     · If your doctor has not set you up with a cardiac rehabilitation (rehab) program, talk to him or her about whether that is right for you. Cardiac rehab includes supervised exercise.  It also includes help with diet and lifestyle changes and emotional support. It may reduce your risk of future heart problems.     · Increase your activities slowly. Take short rest breaks when you get tired.     · If your doctor recommends it, get more exercise. Walking is a good choice. Bit by bit, increase the amount you walk every day. Try for at least 30 minutes on most days of the week. You also may want to swim, bike, or do other activities. Talk with your doctor before you start an exercise program to make sure it is safe for you.     · Ask your doctor when you can drive, go back to work, and do other daily activities again.     · You can have sex as soon as you feel ready for it. Often this means when you can easily walk around or climb stairs. Talk with your doctor if you have any concerns. If you are taking nitroglycerin, do not take erection-enhancing medicine such as sildenafil (Viagra), tadalafil (Cialis), or vardenafil (Levitra) .    Lifestyle changes    · Do not smoke. Smoking increases your risk of another heart attack. If you need help quitting, talk to your doctor about stop-smoking programs and medicines. These can increase your chances of quitting for good.     · Eat a heart-healthy diet that is low in saturated fat and salt, and is full of fruits, vegetables and whole-grains. Eat at least two servings of fish each week. You may get more details about how to eat healthy. But these tips can help you get started.     · Stay at a healthy weight, or lose weight if you need to. Medicines    · Be safe with medicines. Take your medicines exactly as prescribed. Call your doctor if you think you are having a problem with your medicine. You will get more details on the specific medicines your doctor prescribes. Do not stop taking your medicine unless your doctor tells you to. Not taking your medicine might raise your risk of having another heart attack.     · You may need several medicines to help lower your risk of another heart attack.  These include:  ? Blood pressure medicines such as angiotensin-converting enzyme (ACE) inhibitors, ARBs (angiotensin II receptor blockers), and beta-blockers. ? Cholesterol medicine called statins. ? Aspirin and other blood thinners. These prevent blood clots that can cause a heart attack.     · If your doctor has given you nitroglycerin, keep it with you at all times. If you have angina symptoms, such as chest pain or pressure, sit down and rest. Take the first dose of nitroglycerin as directed. If symptoms get worse or are not getting better within 5 minutes, call 911 right away. Stay on the phone. The emergency  will tell you what to do.     · Do not take any over-the-counter medicines, vitamins, or herbal products without talking to your doctor first.    Staying healthy    · Manage other health conditions such as high blood pressure and diabetes.     · Avoid colds and flu. Get a pneumococcal vaccine shot. If you have had one before, ask your doctor whether you need another dose. Get the flu vaccine every year. If you must be around people with colds or flu, wash your hands often.     · Be sure to tell your doctor about any angina symptoms you have had, even if they went away. Pay attention to your angina symptoms. Know what is typical for you and learn how to control it. Know when to call for help.     · Talk to your family, friends, or a counselor about your feelings. It is normal to feel frightened, angry, hopeless, helpless, and even guilty. Talking openly about bad feelings can help you cope. If you have symptoms of depression, talk to your doctor. When should you call for help? Call 911 anytime you think you may need emergency care. For example, call if:    · You have symptoms of a heart attack. These may include:  ? Chest pain or pressure, or a strange feeling in the chest.  ? Sweating. ? Shortness of breath. ? Nausea or vomiting.   ? Pain, pressure, or a strange feeling in the back, neck, jaw, or upper belly or in one or both shoulders or arms. ? Lightheadedness or sudden weakness. ? A fast or irregular heartbeat. After you call 911, the  may tell you to chew 1 adult-strength or 2 to 4 low-dose aspirin. Wait for an ambulance. Do not try to drive yourself.     · You have angina symptoms (such as chest pain or pressure) that do not go away with rest or are not getting better within 5 minutes after you take a dose of nitroglycerin.     · You passed out (lost consciousness).     · You feel like you are having another heart attack.    Call your doctor now or seek immediate medical care if:    · You are having angina symptoms, such as chest pain or pressure, more often than usual, or the symptoms are different or worse than usual.     · You have new or increased shortness of breath.     · You are dizzy or lightheaded, or you feel like you may faint.    Watch closely for changes in your health, and be sure to contact your doctor if you have any problems. Where can you learn more? Go to http://damon-abby.info/. Enter [id number] in the search box to learn more about \"Heart Attack: Care Instructions. \"  Current as of: December 6, 2017  Content Version: 11.8  © 3760-7305 [employer]. Care instructions adapted under license by "[employer] (which disclaims liability or warranty for this information). If you have questions about a medical condition or this instruction, always ask your healthcare professional. [place] [address] any warranty or liability for your use of this information. Heart-Healthy Diet: Care Instructions  Your Care Instructions    A heart-healthy diet has lots of vegetables, fruits, nuts, beans, and whole grains, and is low in salt. It limits foods that are high in saturated fat, such as meats, cheeses, and fried foods.  It may be hard to change your diet, but even small changes can lower your risk of heart attack and heart disease. Follow-up care is a key part of your treatment and safety. Be sure to make and go to all appointments, and call your doctor if you are having problems. It's also a good idea to know your test results and keep a list of the medicines you take. How can you care for yourself at home? Watch your portions  · Learn what a serving is. A \"serving\" and a \"portion\" are not always the same thing. Make sure that you are not eating larger portions than are recommended. For example, a serving of pasta is ½ cup. A serving size of meat is 2 to 3 ounces. A 3-ounce serving is about the size of a deck of cards. Measure serving sizes until you are good at [place]" them. Keep in mind that restaurants often serve portions that are 2 or 3 times the size of one serving. · To keep your energy level up and keep you from feeling hungry, eat often but in smaller portions. · Eat only the number of calories you need to stay at a healthy weight. If you need to lose weight, eat fewer calories than your body burns (through exercise and other physical activity). Eat more fruits and vegetables  · Eat a variety of fruit and vegetables every day. Dark green, deep orange, red, or yellow fruits and vegetables are especially good for you. Examples include spinach, carrots, peaches, and berries. · Keep carrots, celery, and other veggies handy for snacks. Buy fruit that is in season and store it where you can see it so that you will be tempted to eat it. · Cook dishes that have a lot of veggies in them, such as stir-fries and soups. Limit saturated and trans fat  · Read food labels, and try to avoid saturated and trans fats. They increase your risk of heart disease. Trans fat is found in many processed foods such as cookies and crackers. · Use olive or canola oil when you cook. Try cholesterol-lowering spreads, such as Benecol or Take Control. · Bake, broil, grill, or steam foods instead of frying them.   · Choose lean meats instead of high-fat meats such as hot dogs and sausages. Cut off all visible fat when you prepare meat. · Eat fish, skinless poultry, and meat alternatives such as soy products instead of high-fat meats. Soy products, such as tofu, may be especially good for your heart. · Choose low-fat or fat-free milk and dairy products. Eat fish  · Eat at least two servings of fish a week. Certain fish, such as salmon and tuna, contain omega-3 fatty acids, which may help reduce your risk of heart attack. Eat foods high in fiber  · Eat a variety of grain products every day. Include whole-grain foods that have lots of fiber and nutrients. Examples of whole-grain foods include oats, whole wheat bread, and brown rice. · Buy whole-grain breads and cereals, instead of white bread or pastries. Limit salt and sodium  · Limit how much salt and sodium you eat to help lower your blood pressure. · Taste food before you salt it. Add only a little salt when you think you need it. With time, your taste buds will adjust to less salt. · Eat fewer snack items, fast foods, and other high-salt, processed foods. Check food labels for the amount of sodium in packaged foods. · Choose low-sodium versions of canned goods (such as soups, vegetables, and beans). Limit sugar  · Limit drinks and foods with added sugar. These include candy, desserts, and soda pop. Limit alcohol  · Limit alcohol to no more than 2 drinks a day for men and 1 drink a day for women. Too much alcohol can cause health problems. When should you call for help? Watch closely for changes in your health, and be sure to contact your doctor if:    · You would like help planning heart-healthy meals. Where can you learn more? Go to http://damon-abby.info/. Enter V137 in the search box to learn more about \"Heart-Healthy Diet: Care Instructions. \"  Current as of: December 6, 2017  Content Version: 11.8  © 7428-6844 [employer].  Care instructions adapted under license by [employer] (which disclaims liability or warranty for this information). If you have questions about a medical condition or this instruction, always ask your healthcare professional. [address] any warranty or liability for your use of this information. Reducing Heart Attack Risk With Daily Medicine: Care Instructions  Your Care Instructions    Heart disease is the number one cause of death. If you are at risk for heart disease, there are many medicines that can reduce your risk. These include:  · ACE inhibitors or ARBs. These are types of blood pressure medicines. They can reduce the risk of heart attacks and strokes if you are at high risk. · Statin medicines. These lower cholesterol. They can also reduce the risk of heart disease and strokes. · Aspirin and other antiplatelets. These prevent blood clots. They can help certain people lower their risk of a heart attack or stroke. · Beta-blocker medicines. These are a type of blood pressure and heart medicine. They can reduce the chance of early death if you have had a heart attack. All medicines can cause side effects. So it is important to understand the pros and cons of any medicine you take. It is also important to take your medicines exactly as your doctor tells you to. Follow-up care is a key part of your treatment and safety. Be sure to make and go to all appointments, and call your doctor if you are having problems. It's also a good idea to know your test results and keep a list of the medicines you take. ACE inhibitors  ACE (angiotensin-converting enzyme) inhibitors are used for three main reasons. They lower blood pressure, protect the kidneys, and prevent heart attacks and strokes. Examples include benazepril (Lotensin), lisinopril (Prinivil, Zestril), and ramipril (Altace). An angiotensin II receptor blocker (ARB) may be used instead of an ACE inhibitor.  ARBs help you in the same ways as ACE inhibitors. Examples include candesartan (Atacand), irbesartan (Avapro), losartan (Cozaar). Before you start taking an ACE inhibitor or an ARB, make sure your doctor knows if:  · You are taking a water pill (diuretic). · You are taking potassium pills or using salt substitutes. · You are pregnant or breastfeeding. · You have had a kidney transplant or other kidney problems. ACE inhibitors and ARBs can cause side effects. Call your doctor right away if you have:  · Trouble breathing. · Swelling in your face, head, neck, or tongue. · Dizziness or lightheadedness. · A dry cough. Statins  Statins lower cholesterol. Examples include atorvastatin (Lipitor), lovastatin (Mevacor), pravastatin (Pravachol), and simvastatin (Zocor). Before you start taking a statin, make sure your doctor knows if:  · You have had a kidney transplant or other kidney problems. · You have liver disease. · You take any other prescription medicine, over-the-counter medicine, vitamins, supplements, or herbal remedies. · You are pregnant or breastfeeding. Statins can cause side effects. Call your doctor right away if you have:  · New, severe muscle aches. · Brown urine. Aspirin  Taking an aspirin every day can lower your risk for a heart attack. A heart attack occurs when a blood vessel in the heart gets blocked. When this happens, oxygen can't get to the heart muscle, and part of the heart dies. Aspirin can help prevent blood clots that can block the blood vessels. Talk to your doctor before you start taking aspirin every day. He or she may recommend that you take one low-dose aspirin (81 mg) tablet each day, with a meal and a full glass of water. Taking aspirin isn't right for everyone. This is because it can cause serious bleeding. And you may not be able to use aspirin if you:  · Have asthma. · Have an ulcer or other stomach problem.   · Take some other medicine (called a blood thinner) that prevents blood clots.  · Are allergic to aspirin. Before having a surgery or procedure, tell your doctor or dentist that you take aspirin. He or she will tell you if you should stop taking aspirin beforehand. Make sure that you understand exactly what your doctor wants you to do. Aspirin can cause side effects. Call your doctor right away if you have:  · Unusual bleeding or bruising. · Nausea, vomiting, or heartburn. · Black or bloody stools. Beta-blockers  Beta-blockers are used for three main reasons. They lower blood pressure, relieve angina symptoms (such as chest pain or pressure), and reduce the chances of a second heart attack. They include atenolol (Tenormin), carvedilol (Coreg), and metoprolol (Lopressor). Before you start taking a beta-blocker, make sure your doctor knows if you have:  · Severe asthma or frequent asthma attacks. · A very slow pulse (less than 55 beats a minute). Beta-blockers can cause side effects. Call your doctor right away if you have:  · Wheezing or trouble breathing. · Dizziness or lightheadedness. · Asthma that gets worse. When should you call for help? Watch closely for changes in your health, and be sure to contact your doctor if you have any problems. Where can you learn more? Go to http://damon-abby.info/. Enter R428 in the search box to learn more about \"Reducing Heart Attack Risk With Daily Medicine: Care Instructions. \"  Current as of: December 6, 2017  Content Version: 11.8  © 1172-5094 [employer]. Care instructions adapted under license by [employer] (which disclaims liability or warranty for this information). If you have questions about a medical condition or this instruction, always ask your healthcare professional. [place] [address] any warranty or liability for your use of this information.       DISCHARGE SUMMARY from Nurse    PATIENT INSTRUCTIONS:    After general anesthesia or intravenous sedation, for 24 hours or while taking prescription Narcotics:  · Limit your activities  · Do not drive and operate hazardous machinery  · Do not make important personal or business decisions  · Do  not drink alcoholic beverages  · If you have not urinated within 8 hours after discharge, please contact your surgeon on call. Report the following to your surgeon:  · Excessive pain, swelling, redness or odor of or around the surgical area  · Temperature over 100.5  · Nausea and vomiting lasting longer than 4 hours or if unable to take medications  · Any signs of decreased circulation or nerve impairment to extremity: change in color, persistent  numbness, tingling, coldness or increase pain  · Any questions    What to do at Home:    *  Please give a list of your current medications to your Primary Care Provider. *  Please update this list whenever your medications are discontinued, doses are      changed, or new medications (including over-the-counter products) are added. *  Please carry medication information at all times in case of emergency situations. These are general instructions for a healthy lifestyle:    No smoking/ No tobacco products/ Avoid exposure to second hand smoke  Surgeon General's Warning:  Quitting smoking now greatly reduces serious risk to your health. Obesity, smoking, and sedentary lifestyle greatly increases your risk for illness    A healthy diet, regular physical exercise & weight monitoring are important for maintaining a healthy lifestyle    You may be retaining fluid if you have a history of heart failure or if you experience any of the following symptoms:  Weight gain of 3 pounds or more overnight or 5 pounds in a week, increased swelling in our hands or feet or shortness of breath while lying flat in bed. Please call your doctor as soon as you notice any of these symptoms; do not wait until your next office visit.     Recognize signs and symptoms of STROKE:    F-face looks uneven    A-arms unable to move or move unevenly    S-speech slurred or non-existent    T-time-call 911 as soon as signs and symptoms begin-DO NOT go       Back to bed or wait to see if you get better-TIME IS BRAIN. Warning Signs of HEART ATTACK     Call 911 if you have these symptoms:   Chest discomfort. Most heart attacks involve discomfort in the center of the chest that lasts more than a few minutes, or that goes away and comes back. It can feel like uncomfortable pressure, squeezing, fullness, or pain.  Discomfort in other areas of the upper body. Symptoms can include pain or discomfort in one or both arms, the back, neck, jaw, or stomach.  Shortness of breath with or without chest discomfort.  Other signs may include breaking out in a cold sweat, nausea, or lightheadedness. Don't wait more than five minutes to call 911 - MINUTES MATTER! Fast action can save your life. Calling 911 is almost always the fastest way to get lifesaving treatment. Emergency Medical Services staff can begin treatment when they arrive -- up to an hour sooner than if someone gets to the hospital by car. The discharge information has been reviewed with the patient. The patient verbalized understanding. Discharge medications reviewed with the patient and appropriate educational materials and side effects teaching were provided.   ___________________________________________________________________________________________________________________________________

## 2018-12-07 NOTE — PROGRESS NOTES
Bedside and Verbal shift change report given to Meagan Plata RN (oncoming nurse) by self Lowell Aguirre nurse).  Report included the following information SBAR, Kardex, ED Summary, Procedure Summary, Intake/Output, MAR, Recent Results and Cardiac Rhythm SR.

## 2018-12-07 NOTE — PROCEDURES
[address] Jazmine Ornelas  MR#: [id number]  : 1955  ACCOUNT #: [de-identified]   DATE OF SERVICE: 2018    INDICATIONS:  The patient is a 76-year-old who presented to [address] [place] [address] with complaints of substernal chest pain and increasing fatigue. He was found to have a mildly positive troponin and was scheduled for transfer to [facility] for left heart catheterization to evaluate for a non-ST elevation MI.    BLOOD LOSS:  10 mL. SEDATION:  The patient was given 3 mg of Versed and 50 mcg of fentanyl and monitored for conscious sedation beginning at 1530 and ending at 16:21 by nurse Avelino Delaney. SPECIMENS REMOVED:  None. COMPLICATIONS:  None. ASSISTANT:  None. Preprocedure timeout was completed. Mallampati score II. ASA score 2. DESCRIPTION OF PROCEDURE:  After informed consent, the patient was prepped and draped in the usual sterile fashion. The right wrist was infiltrated with lidocaine. The right radial artery was accessed via the modified Seldinger technique with 6-Beninese sheath. A total of 130 mL of Isovue contrast were used for the entire procedure. A Terumo band was used for hemostasis. CATHETERS USED:  Included an XB 3.5 guide, a JL3.5 diagnostic catheter, a JL5 diagnostic and angled pigtail catheter, all 6-Beninese in diameter. FINDINGS:  1. Left ventricle:  Left ventricular ejection fraction is estimated at 55-60% with normal wall motion. 2.  LVEDP:  28 mmHg. 3.  Left main:  Normal.  4.  Left anterior descending artery had luminal irregularities throughout, no greater than 20%. 5.  Circumflex artery had luminal irregularities throughout with a focal stenosis in the mid portion of the vessel of 95%. 6.  Right coronary artery was a small, nondominant, sub 2 mm vessel with a stenosis approximately 50% in the mid portion. INTERVENTION:  It was decided to intervene upon the mid circumflex. An XB 3.5 guide was taken down the root of the aorta. The patient was administered enough heparin to achieve an ACT greater than 280. A BMW Elite wire was taken down and easily crossed the lesion. Next, the lesion was predilated with a 3.0 x 15 compliant balloon. Next, a 3.5 x 18 Xience drug-eluting stent was placed across the lesion and deployed. Next, angiography immediately post-deployment showed excellent angiographic result. It was postdilated with 3.75 x 12 NC Trek balloon, which were done with serial high pressure inflations. The patient was loaded with Brilinta while on the table. He tolerated the procedure without complications. He will be transferred to the telemetry floor for further monitoring and we will initiate a beta blocker, statin and aspirin therapy as well.       MD MEGHAN Corbin / [place]  D: 12/06/2018 16:29     T: 12/06/2018 18:54  JOB #: [id number]

## 2019-12-16 ENCOUNTER — HOSPITAL ENCOUNTER (OUTPATIENT)
Dept: LAB | Age: 64
Discharge: HOME | End: 2019-12-16
Payer: MEDICARE

## 2019-12-16 DIAGNOSIS — E78.00 PURE HYPERCHOLESTEROLEMIA: ICD-10-CM

## 2019-12-16 DIAGNOSIS — I25.10 ATHEROSCLEROSIS OF NATIVE CORONARY ARTERY OF NATIVE HEART WITHOUT ANGINA PECTORIS: ICD-10-CM

## 2019-12-16 LAB
CHOLEST SERPL-MCNC: 146 MG/DL
HDLC SERPL-MCNC: 46 MG/DL (ref 40–60)
HDLC SERPL: 3.2 {RATIO}
LDLC SERPL CALC-MCNC: 84.6 MG/DL
LIPID PROFILE,FLP: NORMAL
TRIGL SERPL-MCNC: 77 MG/DL (ref 35–150)
VLDLC SERPL CALC-MCNC: 15.4 MG/DL (ref 6–23)

## 2019-12-16 PROCEDURE — 80061 LIPID PANEL: CPT

## 2019-12-16 PROCEDURE — 36415 COLL VENOUS BLD VENIPUNCTURE: CPT

## 2020-06-16 PROBLEM — I10 ESSENTIAL HYPERTENSION: Status: ACTIVE | Noted: 2020-06-16

## 2022-02-10 ENCOUNTER — HOSPITAL ENCOUNTER (OUTPATIENT)
Dept: LAB | Age: 67
Discharge: HOME | End: 2022-02-10
Payer: MEDICARE

## 2022-02-10 DIAGNOSIS — E78.2 MIXED HYPERLIPIDEMIA: ICD-10-CM

## 2022-02-10 DIAGNOSIS — I10 ESSENTIAL HYPERTENSION: ICD-10-CM

## 2022-02-10 LAB
CHOLEST SERPL-MCNC: 91 MG/DL
COLLECTION COMMENT, COLCM: NORMAL
HDLC SERPL-MCNC: 33 MG/DL (ref 40–60)
HDLC SERPL: 2.8 {RATIO}
LDLC SERPL CALC-MCNC: 36.4 MG/DL
TRIGL SERPL-MCNC: 108 MG/DL (ref 35–150)
VLDLC SERPL CALC-MCNC: 21.6 MG/DL (ref 6–23)

## 2022-02-10 PROCEDURE — 36415 COLL VENOUS BLD VENIPUNCTURE: CPT

## 2022-02-10 PROCEDURE — 80061 LIPID PANEL: CPT

## 2022-03-14 ENCOUNTER — APPOINTMENT (RX ONLY)
Dept: URBAN - METROPOLITAN AREA CLINIC 23 | Facility: CLINIC | Age: 67
Setting detail: DERMATOLOGY
End: 2022-03-14

## 2022-03-19 PROBLEM — I20.0 UNSTABLE ANGINA (HCC): Status: ACTIVE | Noted: 2018-12-05

## 2022-03-19 PROBLEM — I10 ESSENTIAL HYPERTENSION: Status: ACTIVE | Noted: 2020-06-16

## 2022-03-19 PROBLEM — I21.4 NSTEMI (NON-ST ELEVATED MYOCARDIAL INFARCTION) (HCC): Status: ACTIVE | Noted: 2018-12-05

## 2024-07-31 ENCOUNTER — OFFICE VISIT (OUTPATIENT)
Age: 69
End: 2024-07-31
Payer: MEDICARE

## 2024-07-31 VITALS
HEART RATE: 55 BPM | HEIGHT: 70 IN | WEIGHT: 188.6 LBS | BODY MASS INDEX: 27 KG/M2 | SYSTOLIC BLOOD PRESSURE: 132 MMHG | DIASTOLIC BLOOD PRESSURE: 78 MMHG

## 2024-07-31 DIAGNOSIS — I10 ESSENTIAL HYPERTENSION: Primary | ICD-10-CM

## 2024-07-31 DIAGNOSIS — F17.200 TOBACCO USE DISORDER: ICD-10-CM

## 2024-07-31 DIAGNOSIS — E78.2 MIXED HYPERLIPIDEMIA: ICD-10-CM

## 2024-07-31 DIAGNOSIS — I25.10 ATHEROSCLEROSIS OF NATIVE CORONARY ARTERY OF NATIVE HEART WITHOUT ANGINA PECTORIS: ICD-10-CM

## 2024-07-31 PROCEDURE — 3075F SYST BP GE 130 - 139MM HG: CPT | Performed by: INTERNAL MEDICINE

## 2024-07-31 PROCEDURE — 3078F DIAST BP <80 MM HG: CPT | Performed by: INTERNAL MEDICINE

## 2024-07-31 PROCEDURE — 99214 OFFICE O/P EST MOD 30 MIN: CPT | Performed by: INTERNAL MEDICINE

## 2024-07-31 PROCEDURE — 93000 ELECTROCARDIOGRAM COMPLETE: CPT | Performed by: INTERNAL MEDICINE

## 2024-07-31 PROCEDURE — 1123F ACP DISCUSS/DSCN MKR DOCD: CPT | Performed by: INTERNAL MEDICINE

## 2024-07-31 ASSESSMENT — ENCOUNTER SYMPTOMS
EYE PAIN: 0
GASTROINTESTINAL NEGATIVE: 1
BACK PAIN: 0
ABDOMINAL PAIN: 0
SHORTNESS OF BREATH: 0
PHOTOPHOBIA: 0
CHEST TIGHTNESS: 0
EYES NEGATIVE: 1
RESPIRATORY NEGATIVE: 1
ALLERGIC/IMMUNOLOGIC NEGATIVE: 1

## 2024-07-31 NOTE — PROGRESS NOTES
[facility] CARDIOLOGY  [address] [place] [address], [address]  [place] [address]  PHONE: [phone]      24    NAME:  Sree Ceja  : 1955  MRN: [id number]         SUBJECTIVE:   Sree Ceja is a 68 y.o. male seen for follow up of:      Chief Complaint   Patient presents with    Hypertension        Cardiac Hx (Reviewed and summarized by me):  1) CAD               18 NSTEMI - PCI to Circ with JENNIFER (Green)  2) Preserved LVEF  3) Lipids               17 - HDL 27, LDL 49              19 - HDL 32, LDL 37              19 - HDL 46, LDL 84.6              21 - HDL 34, LDL 66, Trig 98   24 - HDL 36, LDL 52, Trig 80, Ratio 2.9  4) Continues to smokes cigars      HPI:  Doing well denies chest pain palpitations orthopnea PND lower extremity edema.  Remains relatively active working on cars.  Last lipid panel was reviewed and is controlled.  His blood pressure is controlled today.  EKG as listed below.    ECG: Sinus bradycardia with occ PVCs normal axis (Independent review/interpretation by me)      Past Medical History, Past Surgical History, Family history, Social History, and Medications were all reviewed with the patient today and updated as necessary.       Current Outpatient Medications:     aspirin 81 MG chewable tablet, Take 1 tablet by mouth daily, Disp: , Rfl:     atorvastatin (LIPITOR) 20 MG tablet, Take 1 tablet by mouth daily, Disp: , Rfl:     LORazepam (ATIVAN) 1 MG tablet, TAKE 1 2 TO 1 (ONE HALF TO ONE) TABLET BY MOUTH AT BEDTIME AS NEEDED FOR ANXIETY, Disp: , Rfl:     losartan (COZAAR) 25 MG tablet, Take 1 tablet by mouth daily, Disp: , Rfl:     Melatonin 5 MG CAPS, Take 5 mg by mouth, Disp: , Rfl:     nitroGLYCERIN (NITROSTAT) 0.4 MG SL tablet, Place 1 tablet under the tongue, Disp: , Rfl:     SUMAtriptan (IMITREX) 100 MG tablet, Take 1 tablet by mouth once as needed, Disp: , Rfl:     venlafaxine 75 MG extended release tablet, Take by mouth daily, Disp: , Rfl:   Not

## (undated) DEVICE — CONTAINER PREFIL FRMLN 40ML --

## (undated) DEVICE — CANNULA NSL ORAL AD FOR CAPNOFLEX CO2 O2 AIRLFE

## (undated) DEVICE — BLOCK BITE AD 60FR W/ VELC STRP ADDRESSES MOST PT AND

## (undated) DEVICE — FORCEPS BX L240CM JAW DIA2.8MM L CAP W/ NDL MIC MESH TOOTH

## (undated) DEVICE — KENDALL RADIOLUCENT FOAM MONITORING ELECTRODE RECTANGULAR SHAPE: Brand: KENDALL

## (undated) DEVICE — REM POLYHESIVE ADULT PATIENT RETURN ELECTRODE: Brand: VALLEYLAB

## (undated) DEVICE — FCPS BX HOT RJ4 2.2MMX240CM -- RADIAL JAW 4 BX/40

## (undated) DEVICE — CONNECTOR TBNG OD5-7MM O2 END DISP